# Patient Record
Sex: FEMALE | Race: WHITE | Employment: FULL TIME | URBAN - METROPOLITAN AREA
[De-identification: names, ages, dates, MRNs, and addresses within clinical notes are randomized per-mention and may not be internally consistent; named-entity substitution may affect disease eponyms.]

---

## 2017-03-06 ENCOUNTER — HOSPITAL ENCOUNTER (OUTPATIENT)
Dept: ULTRASOUND IMAGING | Age: 57
Discharge: HOME | End: 2017-03-06
Attending: SPECIALIST
Payer: COMMERCIAL

## 2017-03-06 DIAGNOSIS — E03.9 UNSPECIFIED HYPOTHYROIDISM: ICD-10-CM

## 2017-03-06 DIAGNOSIS — E89.0 POSTSURGICAL HYPOTHYROIDISM: ICD-10-CM

## 2017-03-06 PROCEDURE — 76536 US EXAM OF HEAD AND NECK: CPT

## 2017-05-31 ENCOUNTER — HOSPITAL ENCOUNTER (OUTPATIENT)
Dept: MAMMOGRAPHY | Age: 57
Discharge: HOME | End: 2017-05-31
Attending: OBSTETRICS & GYNECOLOGY
Payer: COMMERCIAL

## 2017-05-31 DIAGNOSIS — Z12.31 VISIT FOR SCREENING MAMMOGRAM: ICD-10-CM

## 2017-05-31 PROCEDURE — 77067 SCR MAMMO BI INCL CAD: CPT

## 2018-07-20 ENCOUNTER — HOSPITAL ENCOUNTER (OUTPATIENT)
Dept: MAMMOGRAPHY | Age: 58
Discharge: HOME | End: 2018-07-20
Attending: OBSTETRICS & GYNECOLOGY
Payer: COMMERCIAL

## 2018-07-20 DIAGNOSIS — Z12.31 VISIT FOR SCREENING MAMMOGRAM: ICD-10-CM

## 2018-07-20 PROCEDURE — 77067 SCR MAMMO BI INCL CAD: CPT

## 2019-03-04 ENCOUNTER — HOSPITAL ENCOUNTER (EMERGENCY)
Age: 59
Discharge: HOME | End: 2019-03-04
Attending: EMERGENCY MEDICINE
Payer: COMMERCIAL

## 2019-03-04 ENCOUNTER — APPOINTMENT (OUTPATIENT)
Dept: GENERAL RADIOLOGY | Age: 59
End: 2019-03-04
Attending: EMERGENCY MEDICINE
Payer: COMMERCIAL

## 2019-03-04 VITALS
WEIGHT: 171.08 LBS | RESPIRATION RATE: 19 BRPM | OXYGEN SATURATION: 97 % | HEIGHT: 66 IN | SYSTOLIC BLOOD PRESSURE: 128 MMHG | HEART RATE: 76 BPM | DIASTOLIC BLOOD PRESSURE: 81 MMHG | BODY MASS INDEX: 27.49 KG/M2

## 2019-03-04 DIAGNOSIS — R07.89 CHEST WALL PAIN: Primary | ICD-10-CM

## 2019-03-04 LAB
ALBUMIN SERPL-MCNC: 3.7 G/DL (ref 3.5–5)
ALBUMIN/GLOB SERPL: 1 {RATIO} (ref 1.1–2.2)
ALP SERPL-CCNC: 86 U/L (ref 45–117)
ALT SERPL-CCNC: 46 U/L (ref 12–78)
ANION GAP SERPL CALC-SCNC: 6 MMOL/L (ref 5–15)
AST SERPL-CCNC: 43 U/L (ref 15–37)
ATRIAL RATE: 76 BPM
BASOPHILS # BLD: 0.1 K/UL (ref 0–0.1)
BASOPHILS NFR BLD: 1 % (ref 0–1)
BILIRUB SERPL-MCNC: 0.3 MG/DL (ref 0.2–1)
BUN SERPL-MCNC: 15 MG/DL (ref 6–20)
BUN/CREAT SERPL: 18 (ref 12–20)
CALCIUM SERPL-MCNC: 9.1 MG/DL (ref 8.5–10.1)
CALCULATED P AXIS, ECG09: 76 DEGREES
CALCULATED R AXIS, ECG10: 97 DEGREES
CALCULATED T AXIS, ECG11: 92 DEGREES
CHLORIDE SERPL-SCNC: 108 MMOL/L (ref 97–108)
CK SERPL-CCNC: 117 U/L (ref 26–192)
CO2 SERPL-SCNC: 29 MMOL/L (ref 21–32)
COMMENT, HOLDF: NORMAL
CREAT SERPL-MCNC: 0.84 MG/DL (ref 0.55–1.02)
D DIMER PPP FEU-MCNC: 0.25 MG/L FEU (ref 0–0.65)
DIAGNOSIS, 93000: NORMAL
DIFFERENTIAL METHOD BLD: NORMAL
EOSINOPHIL # BLD: 0.2 K/UL (ref 0–0.4)
EOSINOPHIL NFR BLD: 4 % (ref 0–7)
ERYTHROCYTE [DISTWIDTH] IN BLOOD BY AUTOMATED COUNT: 13 % (ref 11.5–14.5)
GLOBULIN SER CALC-MCNC: 3.6 G/DL (ref 2–4)
GLUCOSE SERPL-MCNC: 90 MG/DL (ref 65–100)
HCT VFR BLD AUTO: 38.4 % (ref 35–47)
HGB BLD-MCNC: 12.8 G/DL (ref 11.5–16)
IMM GRANULOCYTES # BLD AUTO: 0 K/UL (ref 0–0.04)
IMM GRANULOCYTES NFR BLD AUTO: 0 % (ref 0–0.5)
LYMPHOCYTES # BLD: 1.9 K/UL (ref 0.8–3.5)
LYMPHOCYTES NFR BLD: 36 % (ref 12–49)
MCH RBC QN AUTO: 30.2 PG (ref 26–34)
MCHC RBC AUTO-ENTMCNC: 33.3 G/DL (ref 30–36.5)
MCV RBC AUTO: 90.6 FL (ref 80–99)
MONOCYTES # BLD: 0.3 K/UL (ref 0–1)
MONOCYTES NFR BLD: 6 % (ref 5–13)
NEUTS SEG # BLD: 2.8 K/UL (ref 1.8–8)
NEUTS SEG NFR BLD: 53 % (ref 32–75)
NRBC # BLD: 0 K/UL (ref 0–0.01)
NRBC BLD-RTO: 0 PER 100 WBC
P-R INTERVAL, ECG05: 186 MS
PLATELET # BLD AUTO: 389 K/UL (ref 150–400)
PMV BLD AUTO: 9.7 FL (ref 8.9–12.9)
POTASSIUM SERPL-SCNC: 3.9 MMOL/L (ref 3.5–5.1)
PROT SERPL-MCNC: 7.3 G/DL (ref 6.4–8.2)
Q-T INTERVAL, ECG07: 396 MS
QRS DURATION, ECG06: 84 MS
QTC CALCULATION (BEZET), ECG08: 445 MS
RBC # BLD AUTO: 4.24 M/UL (ref 3.8–5.2)
SAMPLES BEING HELD,HOLD: NORMAL
SODIUM SERPL-SCNC: 143 MMOL/L (ref 136–145)
TROPONIN I SERPL-MCNC: <0.05 NG/ML
TROPONIN I SERPL-MCNC: <0.05 NG/ML
VENTRICULAR RATE, ECG03: 76 BPM
WBC # BLD AUTO: 5.3 K/UL (ref 3.6–11)

## 2019-03-04 PROCEDURE — 85379 FIBRIN DEGRADATION QUANT: CPT

## 2019-03-04 PROCEDURE — 36415 COLL VENOUS BLD VENIPUNCTURE: CPT

## 2019-03-04 PROCEDURE — 84484 ASSAY OF TROPONIN QUANT: CPT

## 2019-03-04 PROCEDURE — 96374 THER/PROPH/DIAG INJ IV PUSH: CPT

## 2019-03-04 PROCEDURE — 82550 ASSAY OF CK (CPK): CPT

## 2019-03-04 PROCEDURE — 93005 ELECTROCARDIOGRAM TRACING: CPT

## 2019-03-04 PROCEDURE — 74011250636 HC RX REV CODE- 250/636: Performed by: EMERGENCY MEDICINE

## 2019-03-04 PROCEDURE — 71046 X-RAY EXAM CHEST 2 VIEWS: CPT

## 2019-03-04 PROCEDURE — 80053 COMPREHEN METABOLIC PANEL: CPT

## 2019-03-04 PROCEDURE — 85025 COMPLETE CBC W/AUTO DIFF WBC: CPT

## 2019-03-04 PROCEDURE — 99285 EMERGENCY DEPT VISIT HI MDM: CPT

## 2019-03-04 RX ORDER — KETOROLAC TROMETHAMINE 10 MG/1
10 TABLET, FILM COATED ORAL
Qty: 20 TAB | Refills: 0 | Status: SHIPPED | OUTPATIENT
Start: 2019-03-04 | End: 2019-11-21

## 2019-03-04 RX ORDER — KETOROLAC TROMETHAMINE 30 MG/ML
30 INJECTION, SOLUTION INTRAMUSCULAR; INTRAVENOUS
Status: COMPLETED | OUTPATIENT
Start: 2019-03-04 | End: 2019-03-04

## 2019-03-04 RX ORDER — DIAZEPAM 5 MG/1
5 TABLET ORAL
Qty: 15 TAB | Refills: 0 | Status: SHIPPED | OUTPATIENT
Start: 2019-03-04 | End: 2019-11-21

## 2019-03-04 RX ADMIN — KETOROLAC TROMETHAMINE 30 MG: 30 INJECTION, SOLUTION INTRAMUSCULAR; INTRAVENOUS at 12:31

## 2019-03-04 NOTE — ED PROVIDER NOTES
EMERGENCY DEPARTMENT HISTORY AND PHYSICAL EXAM 
 
 
Date: 3/4/2019 Patient Name: Deric Hamilton History of Presenting Illness Chief Complaint Patient presents with  Chest Pain  
  ambulatory to triage, reports left sided chest pain that is sharp and radiates to her back approx 1 hour ago pain increases with deep inspiration. No cardiac history, no recent travel or DVT hx  
 
 
History Provided By: Patient HPI: Deric Hamilton, 63 y.o. female with PMHx significant for HTN, hypothyroidism, hypercholesterolemia, presents via wheelchair to the ED with cc of new onset, constant, 6/10, sharp left upper chest pain radiating to the left upper back x 0500 this morning. Pt reports associated sx of mild SOB as well. She expresses she woke up with discomfort this morning with no recent exertion, heavy lifting or heavy pulling. Pt discloses her SOB is exacerbated with deep inspiration and has found no alleviating factors to her sx leading her to the ED. Pt denies taking any medications PTA. Pt denies any h/o DVT/PE, recent surgery, recent travel or being on BCP. She denies any fevers, chills, cough, abdominal pain, nausea, vomiting, diarrhea, dysuria. There are no other complaints, changes, or physical findings at this time. PCP: BELA Clifton 
SHx: (-)Tobacco; (+) ETOH: socially; (-) Illicit drug use No current facility-administered medications on file prior to encounter. Current Outpatient Medications on File Prior to Encounter Medication Sig Dispense Refill  losartan-hydrochlorothiazide (HYZAAR) 100-12.5 mg per tablet TAKE ONE TABLET BY MOUTH ONE TIME DAILY 30 Tab 5  cyanocobalamin (VITAMIN B-12) 1,000 mcg tablet Take 1,000 mcg by mouth daily.  loratadine (CLARITIN) 10 mg tablet Take 10 mg by mouth daily.  naproxen (NAPROSYN) 250 mg tablet Take  by mouth as needed.  lubiPROStone (AMITIZA) 8 mcg capsule Take  by mouth.  levothyroxine (SYNTHROID) 137 mcg tablet Take 137 mcg by mouth Daily (before breakfast).  lansoprazole (PREVACID) 30 mg capsule Take  by mouth Daily (before breakfast).  rosuvastatin (CRESTOR) 5 mg tablet Take  by mouth nightly.  FluvoxaMINE (LUVOX CR) 100 mg CR cap Take 200 mg by mouth daily. Past History Past Medical History: 
Past Medical History:  
Diagnosis Date  Hypercholesteremia  Hypertension  Hypothyroidism Past Surgical History: 
Past Surgical History:  
Procedure Laterality Date  HX BLADDER SUSPENSION    
 HX  SECTION    
 HX HYSTERECTOMY  HX ORTHOPAEDIC    
 neck surgery Family History: No family history on file. Social History: 
Social History Tobacco Use  Smoking status: Never Smoker Substance Use Topics  Alcohol use: Yes Comment: socially  Drug use: No  
 
 
Allergies: Allergies Allergen Reactions  Doxycycline Hives Review of Systems Review of Systems Constitutional: Negative. Negative for appetite change, chills, fatigue and fever. HENT: Negative. Negative for congestion, rhinorrhea, sinus pressure and sore throat. Eyes: Negative. Respiratory: Negative. Negative for cough, choking, chest tightness, shortness of breath and wheezing. Cardiovascular: Positive for chest pain (left upper ). Negative for palpitations and leg swelling. Gastrointestinal: Negative for abdominal pain, constipation, diarrhea, nausea and vomiting. Endocrine: Negative. Genitourinary: Negative. Negative for difficulty urinating, dysuria, flank pain and urgency. Musculoskeletal: Positive for back pain (left upper ). Skin: Negative. Neurological: Negative. Negative for dizziness, speech difficulty, weakness, light-headedness, numbness and headaches. Psychiatric/Behavioral: Negative. All other systems reviewed and are negative.  
 
 
Physical Exam  
Physical Exam  
 Constitutional: She is oriented to person, place, and time. She appears well-developed and well-nourished. No distress. HENT:  
Head: Normocephalic and atraumatic. Mouth/Throat: Oropharynx is clear and moist. No oropharyngeal exudate. Eyes: Conjunctivae and EOM are normal. Pupils are equal, round, and reactive to light. Neck: Normal range of motion. Neck supple. No JVD present. No tracheal deviation present. Cardiovascular: Normal rate, regular rhythm, normal heart sounds and intact distal pulses. No murmur heard. Pulmonary/Chest: Effort normal and breath sounds normal. No stridor. No respiratory distress. She has no wheezes. She has no rales. She exhibits no tenderness. Abdominal: Soft. She exhibits no distension. There is no tenderness. There is no rebound and no guarding. Obese Musculoskeletal: Normal range of motion. She exhibits no edema or tenderness. Neurological: She is alert and oriented to person, place, and time. No cranial nerve deficit. No gross motor or sensory deficits Skin: Skin is warm and dry. She is not diaphoretic. Psychiatric: She has a normal mood and affect. Her behavior is normal.  
Nursing note and vitals reviewed. Diagnostic Study Results Labs - Recent Results (from the past 12 hour(s)) EKG, 12 LEAD, INITIAL Collection Time: 03/04/19  6:27 AM  
Result Value Ref Range Ventricular Rate 76 BPM  
 Atrial Rate 76 BPM  
 P-R Interval 186 ms QRS Duration 84 ms Q-T Interval 396 ms QTC Calculation (Bezet) 445 ms Calculated P Axis 76 degrees Calculated R Axis 97 degrees Calculated T Axis 92 degrees Diagnosis Normal sinus rhythm Confirmed by Sierra Moore ([id number]) on 3/4/2019 8:30:12 AM 
  
CBC WITH AUTOMATED DIFF Collection Time: 03/04/19  7:47 AM  
Result Value Ref Range WBC 5.3 3.6 - 11.0 K/uL  
 RBC 4.24 3.80 - 5.20 M/uL  
 HGB 12.8 11.5 - 16.0 g/dL HCT 38.4 35.0 - 47.0 %  MCV 90.6 80.0 - 99.0 FL  
 MCH 30.2 26.0 - 34.0 PG  
 MCHC 33.3 30.0 - 36.5 g/dL  
 RDW 13.0 11.5 - 14.5 % PLATELET 784 369 - 886 K/uL MPV 9.7 8.9 - 12.9 FL  
 NRBC 0.0 0  WBC ABSOLUTE NRBC 0.00 0.00 - 0.01 K/uL NEUTROPHILS 53 32 - 75 % LYMPHOCYTES 36 12 - 49 % MONOCYTES 6 5 - 13 % EOSINOPHILS 4 0 - 7 % BASOPHILS 1 0 - 1 % IMMATURE GRANULOCYTES 0 0.0 - 0.5 % ABS. NEUTROPHILS 2.8 1.8 - 8.0 K/UL  
 ABS. LYMPHOCYTES 1.9 0.8 - 3.5 K/UL  
 ABS. MONOCYTES 0.3 0.0 - 1.0 K/UL  
 ABS. EOSINOPHILS 0.2 0.0 - 0.4 K/UL  
 ABS. BASOPHILS 0.1 0.0 - 0.1 K/UL  
 ABS. IMM. GRANS. 0.0 0.00 - 0.04 K/UL  
 DF AUTOMATED METABOLIC PANEL, COMPREHENSIVE Collection Time: 03/04/19  7:47 AM  
Result Value Ref Range Sodium 143 136 - 145 mmol/L Potassium 3.9 3.5 - 5.1 mmol/L Chloride 108 97 - 108 mmol/L  
 CO2 29 21 - 32 mmol/L Anion gap 6 5 - 15 mmol/L Glucose 90 65 - 100 mg/dL BUN 15 6 - 20 MG/DL Creatinine 0.84 0.55 - 1.02 MG/DL  
 BUN/Creatinine ratio 18 12 - 20 GFR est AA >60 >60 ml/min/1.73m2 GFR est non-AA >60 >60 ml/min/1.73m2 Calcium 9.1 8.5 - 10.1 MG/DL Bilirubin, total 0.3 0.2 - 1.0 MG/DL  
 ALT (SGPT) 46 12 - 78 U/L  
 AST (SGOT) 43 (H) 15 - 37 U/L Alk. phosphatase 86 45 - 117 U/L Protein, total 7.3 6.4 - 8.2 g/dL Albumin 3.7 3.5 - 5.0 g/dL Globulin 3.6 2.0 - 4.0 g/dL A-G Ratio 1.0 (L) 1.1 - 2.2    
TROPONIN I Collection Time: 03/04/19  7:47 AM  
Result Value Ref Range Troponin-I, Qt. <0.05 <0.05 ng/mL CK W/ REFLX CKMB Collection Time: 03/04/19  7:47 AM  
Result Value Ref Range  26 - 192 U/L  
D DIMER Collection Time: 03/04/19  9:16 AM  
Result Value Ref Range D-dimer 0.25 0.00 - 0.65 mg/L FEU  
TROPONIN I Collection Time: 03/04/19 10:06 AM  
Result Value Ref Range Troponin-I, Qt. <0.05 <0.05 ng/mL Radiologic Studies -  
XR CHEST PA LAT Final Result IMPRESSION:  
NORMAL CHEST. Medical Decision Making I am the first provider for this patient. I reviewed the vital signs, available nursing notes, past medical history, past surgical history, family history and social history. Vital Signs-Reviewed the patient's vital signs. Patient Vitals for the past 12 hrs: 
 Pulse Resp BP SpO2  
03/04/19 1230 69 18 128/83 97 % 03/04/19 1145 70 18 143/87 96 % 03/04/19 1000 73 17 132/90 97 % 03/04/19 0900 64 16 148/89 96 % 03/04/19 0830 77 17 150/88 99 % 03/04/19 0800 69 19 145/90   
03/04/19 0622 75 16 (!) 152/100 99 % Pulse Oximetry Analysis - 99% on RA Cardiac Monitor:  
Rate: 75 bpm 
Rhythm: Normal Sinus Rhythm EKG interpretation: (Preliminary) NSR, rate 76, normal axis/pr/qrs, no acute ST changes, Elvie Goltz, DO 
 
 
Records Reviewed: Nursing Notes, Old Medical Records, Previous electrocardiograms, Previous Radiology Studies and Previous Laboratory Studies Provider Notes (Medical Decision Making): DDx: PE, ACS, chest wall strain ED Course:  
Initial assessment performed. The patients presenting problems have been discussed, and they are in agreement with the care plan formulated and outlined with them. I have encouraged them to ask questions as they arise throughout their visit. Progress Notes: 
 
1:00 PM  
The pt has been re-evaluated. She expresses her sx have improved with tx in the ED. Pt was updated on reassuring workup and plan for discharge with PCP and cardiology follow up if sx persist.  
 
Critical Care Time:  
None Disposition: 
Discharge Note: 
1:01 PM 
The patient is ready for discharge. The patient's signs, symptoms, diagnosis, and discharge instruction have been discussed and the patient has conveyed their understanding. The patient is to follow up as recommended or return to the ER should their symptoms worsen. Plan has been discussed and the patient is in agreement. Written by Lizy Brower ED Scribe, as dictated by Paula Carbajal DO 
 
PLAN: 
1. Current Discharge Medication List  
  
 
2. Follow-up Information None Return to ED if worse Diagnosis Clinical Impression: No diagnosis found. Attestations: 
 
Attestation: This note is prepared by Niyah Brower, acting as Scribe for Mona Muñoz, [address], DO: The scribe's documentation has been prepared under my direction and personally reviewed by me in its entirety. I confirm that the note above accurately reflects all work, treatment, procedures, and medical decision making performed by me.

## 2019-03-04 NOTE — DISCHARGE INSTRUCTIONS

## 2019-03-04 NOTE — ED NOTES
Baldomero Wise DO reviewed discharge instructions with the patient. The patient verbalized understanding. Ambulatory on discharge.

## 2019-08-26 ENCOUNTER — HOSPITAL ENCOUNTER (OUTPATIENT)
Dept: ULTRASOUND IMAGING | Age: 59
Discharge: HOME | End: 2019-08-26
Attending: SPECIALIST
Payer: COMMERCIAL

## 2019-08-26 ENCOUNTER — HOSPITAL ENCOUNTER (OUTPATIENT)
Dept: MAMMOGRAPHY | Age: 59
Discharge: HOME | End: 2019-08-26
Attending: PHYSICIAN ASSISTANT
Payer: COMMERCIAL

## 2019-08-26 DIAGNOSIS — E04.1 NONTOXIC UNINODULAR GOITER: ICD-10-CM

## 2019-08-26 DIAGNOSIS — Z12.39 BREAST SCREENING, UNSPECIFIED: ICD-10-CM

## 2019-08-26 PROCEDURE — 76536 US EXAM OF HEAD AND NECK: CPT

## 2019-08-26 PROCEDURE — 77067 SCR MAMMO BI INCL CAD: CPT

## 2019-11-21 ENCOUNTER — OFFICE VISIT (OUTPATIENT)
Dept: CARDIOLOGY CLINIC | Age: 59
End: 2019-11-21

## 2019-11-21 VITALS
HEART RATE: 70 BPM | WEIGHT: 175.9 LBS | RESPIRATION RATE: 18 BRPM | BODY MASS INDEX: 28.27 KG/M2 | HEIGHT: 66 IN | OXYGEN SATURATION: 95 % | DIASTOLIC BLOOD PRESSURE: 78 MMHG | SYSTOLIC BLOOD PRESSURE: 134 MMHG

## 2019-11-21 DIAGNOSIS — I10 ESSENTIAL HYPERTENSION: ICD-10-CM

## 2019-11-21 DIAGNOSIS — E78.2 MIXED HYPERLIPIDEMIA: ICD-10-CM

## 2019-11-21 DIAGNOSIS — R07.9 CHEST PAIN, UNSPECIFIED TYPE: Primary | ICD-10-CM

## 2019-11-21 RX ORDER — ROSUVASTATIN CALCIUM 10 MG/1
10 TABLET, COATED ORAL
Qty: 90 TAB | Refills: 3 | Status: SHIPPED | OUTPATIENT
Start: 2019-11-21 | End: 2021-02-18

## 2019-11-21 RX ORDER — FENOFIBRATE 160 MG/1
160 TABLET ORAL DAILY
COMMUNITY
Start: 2017-09-28 | End: 2019-11-21

## 2019-11-21 RX ORDER — LOSARTAN POTASSIUM 50 MG/1
25 TABLET ORAL DAILY
COMMUNITY
End: 2020-03-13 | Stop reason: SDUPTHER

## 2019-11-21 RX ORDER — TOLTERODINE 2 MG/1
2 CAPSULE, EXTENDED RELEASE ORAL DAILY
COMMUNITY
Start: 2019-10-15 | End: 2020-12-15

## 2019-11-21 NOTE — PROGRESS NOTES
Marimar Tan, [facility]    Subjective/HPI:     Ms. Nell Landry is a 63 y.o. female is here to establish care. She has a PMHx of HTN, HLD and hypothyroidism. She reports symptoms of bilateral arm tightness that occurs mainly with rest, such as driving or sitting at work. The symptoms resolve on their own. She has no associated symptoms of shortness of breath, dizziness or palpitations. Symptoms have been present for a few months, however noticed an improvement in symptoms in the past month after adjusting the dose of her thyroid medication. She previously had stress test and echocardiogram done in , for different symptoms, which were both normal.         PCP Provider  BELA Silver  Past Medical History:   Diagnosis Date    Hypercholesteremia     Hypertension     Hypothyroidism       Past Surgical History:   Procedure Laterality Date    HX BLADDER SUSPENSION      HX  SECTION      HX HYSTERECTOMY      HX ORTHOPAEDIC      neck surgery     No family history on file.   Social History     Socioeconomic History    Marital status:      Spouse name: Not on file    Number of children: Not on file    Years of education: Not on file    Highest education level: Not on file   Occupational History    Not on file   Social Needs    Financial resource strain: Not on file    Food insecurity:     Worry: Not on file     Inability: Not on file    Transportation needs:     Medical: Not on file     Non-medical: Not on file   Tobacco Use    Smoking status: Never Smoker    Smokeless tobacco: Never Used   Substance and Sexual Activity    Alcohol use: Yes     Comment: socially    Drug use: No    Sexual activity: Not on file   Lifestyle    Physical activity:     Days per week: Not on file     Minutes per session: Not on file    Stress: Not on file   Relationships    Social connections:     Talks on phone: Not on file     Gets together: Not on file     Attends Hinduism service: Not on file     Active member of club or organization: Not on file     Attends meetings of clubs or organizations: Not on file     Relationship status: Not on file    Intimate partner violence:     Fear of current or ex partner: Not on file     Emotionally abused: Not on file     Physically abused: Not on file     Forced sexual activity: Not on file   Other Topics Concern    Not on file   Social History Narrative    Not on file       Allergies   Allergen Reactions    Doxycycline Hives        Current Outpatient Medications   Medication Sig    losartan (COZAAR) 100 mg tablet Take 100 mg by mouth daily.  fenofibrate (LOFIBRA) 160 mg tablet Take 160 mg by mouth daily.  tolterodine ER (DETROL-LA) 2 mg ER capsule Take 2 mg by mouth daily.  cyanocobalamin (VITAMIN B-12) 1,000 mcg tablet Take 1,000 mcg by mouth daily.  loratadine (CLARITIN) 10 mg tablet Take 10 mg by mouth daily.  naproxen (NAPROSYN) 250 mg tablet Take  by mouth as needed.  lubiPROStone (AMITIZA) 8 mcg capsule Take 8 mcg by mouth daily as needed.  levothyroxine (SYNTHROID) 137 mcg tablet Take 137 mcg by mouth Daily (before breakfast).  rosuvastatin (CRESTOR) 5 mg tablet Take  by mouth nightly.  FluvoxaMINE (LUVOX CR) 100 mg CR cap Take 200 mg by mouth daily.  ketorolac (TORADOL) 10 mg tablet Take 1 Tab by mouth every six (6) hours as needed for Pain.  diazePAM (VALIUM) 5 mg tablet Take 1 Tab by mouth every eight (8) hours as needed (spasm). Max Daily Amount: 15 mg.    losartan-hydrochlorothiazide (HYZAAR) 100-12.5 mg per tablet TAKE ONE TABLET BY MOUTH ONE TIME DAILY    lansoprazole (PREVACID) 30 mg capsule Take  by mouth Daily (before breakfast). No current facility-administered medications for this visit. I have reviewed the problem list, allergy list, medical history, family, social history and medications.     Review of Symptoms:    Review of Systems   Constitutional: Negative for chills, fever and weight loss. HENT: Negative for nosebleeds. Eyes: Negative for blurred vision and double vision. Respiratory: Negative for cough, shortness of breath and wheezing. Cardiovascular: Negative for chest pain, palpitations, orthopnea, leg swelling and PND. Gastrointestinal: Negative for abdominal pain, blood in stool, diarrhea, nausea and vomiting. Musculoskeletal: Negative for joint pain. Skin: Negative for rash. Neurological: Negative for dizziness, tingling and loss of consciousness. Endo/Heme/Allergies: Does not bruise/bleed easily. Physical Exam:      General: Well developed, in no acute distress, cooperative and alert  HEENT: No carotid bruits, no JVD, trach is midline. Neck Supple, PEERL, EOM intact. Heart:  reg rate and rhythm; normal S1/S2; no murmurs, gallops or rubs. Respiratory: Clear bilaterally x 4, no wheezing or rales  Abdomen:   Soft, non-tender, no distention, no masses. + BS. Extremities:  Normal cap refill, no cyanosis, atraumatic. No edema. Neuro: A&Ox3, speech clear, gait stable. Skin: Skin color is normal. No rashes or lesions.  Non diaphoretic  Vascular: 2+ pulses symmetric in all extremities    Vitals:    11/21/19 1334 11/21/19 1349 11/21/19 1351   BP: 122/78 130/80 134/78   Pulse: 70     Resp: 18     SpO2: 95%     Weight: 175 lb 14.4 oz (79.8 kg)     Height: 5' 6\" (1.676 m)         Cardiographics    ECG: sinus rhythm  Results for orders placed or performed during the hospital encounter of 03/04/19   EKG, 12 LEAD, INITIAL   Result Value Ref Range    Ventricular Rate 76 BPM    Atrial Rate 76 BPM    P-R Interval 186 ms    QRS Duration 84 ms    Q-T Interval 396 ms    QTC Calculation (Bezet) 445 ms    Calculated P Axis 76 degrees    Calculated R Axis 97 degrees    Calculated T Axis 92 degrees    Diagnosis       Normal sinus rhythm  Confirmed by Tc Streeter ([id number]) on 3/4/2019 8:30:12 AM         Cardiology Labs:  No results found for: CHOL, CHOLX, CHLST, CHOLV, 767004, HDL, HDLP, LDL, LDLC, DLDLP, TGLX, TRIGL, TRIGP, CHHD, CHHDX    Lab Results   Component Value Date/Time    Sodium 143 03/04/2019 07:47 AM    Potassium 3.9 03/04/2019 07:47 AM    Chloride 108 03/04/2019 07:47 AM    CO2 29 03/04/2019 07:47 AM    Anion gap 6 03/04/2019 07:47 AM    Glucose 90 03/04/2019 07:47 AM    BUN 15 03/04/2019 07:47 AM    Creatinine 0.84 03/04/2019 07:47 AM    BUN/Creatinine ratio 18 03/04/2019 07:47 AM    GFR est AA >60 03/04/2019 07:47 AM    GFR est non-AA >60 03/04/2019 07:47 AM    Calcium 9.1 03/04/2019 07:47 AM    Bilirubin, total 0.3 03/04/2019 07:47 AM    AST (SGOT) 43 (H) 03/04/2019 07:47 AM    Alk. phosphatase 86 03/04/2019 07:47 AM    Protein, total 7.3 03/04/2019 07:47 AM    Albumin 3.7 03/04/2019 07:47 AM    Globulin 3.6 03/04/2019 07:47 AM    A-G Ratio 1.0 (L) 03/04/2019 07:47 AM    ALT (SGPT) 46 03/04/2019 07:47 AM           Assessment:     Assessment:       ICD-10-CM ICD-9-CM    1. Chest pain, unspecified type R07.9 786.50 AMB POC EKG ROUTINE W/ 12 LEADS, INTER & REP   2. Mixed hyperlipidemia E78.2 272.2    3. Essential hypertension I10 401.9         Plan:     1. Chest pain, unspecified type  Somewhat atypical symptoms in a patient with risk factors including HTN, HLD, age, family history. Will obtain stress echo and echo to assess  If no recurrent symptoms, then would encourage risk factor modification    2. HLD   in 5/2019; switched from Pravastatin to Crestor 5 mg daily  Recommend increasing Crestor to 10 mg daily    3. HTN  BP controlled. Continue anti-hypertensive therapy and low sodium diet    F/u with Dr. Yee Noel after testing complete. Adelaide Ibarra NP      [facility]    11/22/2019         Patient seen, examined by me personally. Plan discussed as detailed. Agree with note as outlined by  NP. I confirm findings in history and physical exam. No additional findings noted. Agree with plan as outlined above.      Salomon Teague, MD

## 2019-11-21 NOTE — PROGRESS NOTES
1. Have you been to the ER, urgent care clinic since your last visit? Hospitalized since your last visit? No    2. Have you seen or consulted any other health care providers outside of the [address] [place] [address] since your last visit? Include any pap smears or colon screening. No    Chief Complaint   Patient presents with    Chest Pain     Last seen here 2014/2015. 1NP ref by  Job Teague NP.  C/O CP rest or exertion, weakness.

## 2019-12-10 ENCOUNTER — OFFICE VISIT (OUTPATIENT)
Dept: CARDIOLOGY CLINIC | Age: 59
End: 2019-12-10

## 2019-12-10 VITALS
DIASTOLIC BLOOD PRESSURE: 68 MMHG | SYSTOLIC BLOOD PRESSURE: 130 MMHG | RESPIRATION RATE: 16 BRPM | OXYGEN SATURATION: 97 % | BODY MASS INDEX: 27.95 KG/M2 | HEIGHT: 66 IN | HEART RATE: 76 BPM | WEIGHT: 173.9 LBS

## 2019-12-10 DIAGNOSIS — R07.89 ATYPICAL CHEST PAIN: Primary | ICD-10-CM

## 2019-12-10 DIAGNOSIS — I10 ESSENTIAL HYPERTENSION: ICD-10-CM

## 2019-12-10 DIAGNOSIS — R94.39 ABNORMAL STRESS ECHO: ICD-10-CM

## 2019-12-10 RX ORDER — FENOFIBRATE 48 MG/1
160 TABLET, COATED ORAL DAILY
COMMUNITY
End: 2020-06-12

## 2019-12-10 RX ORDER — ASPIRIN 81 MG/1
81 TABLET ORAL DAILY
COMMUNITY
Start: 2019-12-10 | End: 2020-06-12

## 2019-12-10 RX ORDER — AMLODIPINE BESYLATE 2.5 MG/1
2.5 TABLET ORAL DAILY
Qty: 30 TAB | Refills: 3 | Status: SHIPPED | OUTPATIENT
Start: 2019-12-10 | End: 2020-01-15 | Stop reason: ALTCHOICE

## 2019-12-10 NOTE — PROGRESS NOTES
[facility] @ [facility] D[facility], ANP-BC  Subjective/HPI:     Tita Henson is a 63 y.o. female is here for stress echo follow-up. Patient initially seen by us for reports of bilateral arm weakness and atypical discomfort that occurs with driving or at times at rest she denies any exertional component. She is able to perform physical activity without dyspnea on exertion chest pain or fatigue. · Baseline ECG: Normal EKG. · Negative stress test.  · Abnormal stress echocardiogram consistent with ischemia. Intermediate risk study. Mild inferobasal hypokinesis. · Low risk Duke treadmill score. Stress Findings     ECG Baseline ECG: Normal EKG. There was no ST segment deviation noted during rest. There were no arrhythmias during stress. There was no ST segment deviation noted during stress. There were no arrhythmias during recovery. ECG is diagnostic. Stress Findings A stress test was performed following the Marcos protocol, with the patient reaching stage 3. The test was stopped because the patient complained of fatigue. The patient reported no angina during stress. The patient's response to exercise was adequate for diagnosis. Blood pressure demonstrated a normal response to exercise. Heart rate demonstrated maximal response to stress. Overall, the patient's exercise capacity was normal.   Angina Index is 0. PCP Provider  BELA Lazo  Past Medical History:   Diagnosis Date    Hypercholesteremia     Hypertension     Hypothyroidism       Past Surgical History:   Procedure Laterality Date    HX BLADDER SUSPENSION      HX  SECTION      HX HYSTERECTOMY      HX ORTHOPAEDIC      neck surgery     Allergies   Allergen Reactions    Doxycycline Hives      No family history on file. Current Outpatient Medications   Medication Sig    fenofibrate nanocrystallized (TRICOR) 48 mg tablet Take  by mouth daily.     amLODIPine (NORVASC) 2.5 mg tablet Take 1 Tab by mouth daily.  losartan (COZAAR) 100 mg tablet Take 100 mg by mouth daily.  tolterodine ER (DETROL-LA) 2 mg ER capsule Take 2 mg by mouth daily.  rosuvastatin (CRESTOR) 10 mg tablet Take 1 Tab by mouth nightly.  naproxen (NAPROSYN) 250 mg tablet Take  by mouth as needed.  lubiPROStone (AMITIZA) 8 mcg capsule Take 8 mcg by mouth daily as needed.  levothyroxine (SYNTHROID) 137 mcg tablet Take 125 mcg by mouth Daily (before breakfast).  FluvoxaMINE (LUVOX CR) 100 mg CR cap Take 200 mg by mouth daily.  cyanocobalamin (VITAMIN B-12) 1,000 mcg tablet Take 1,000 mcg by mouth daily.  loratadine (CLARITIN) 10 mg tablet Take 10 mg by mouth daily. No current facility-administered medications for this visit.        Vitals:    12/10/19 0921 12/10/19 0928   BP: 122/68 130/68   Pulse: 76    Resp: 16    SpO2: 97%    Weight: 173 lb 14.4 oz (78.9 kg)    Height: 5' 6\" (1.676 m)      Social History     Socioeconomic History    Marital status:      Spouse name: Not on file    Number of children: Not on file    Years of education: Not on file    Highest education level: Not on file   Occupational History    Not on file   Social Needs    Financial resource strain: Not on file    Food insecurity:     Worry: Not on file     Inability: Not on file    Transportation needs:     Medical: Not on file     Non-medical: Not on file   Tobacco Use    Smoking status: Never Smoker    Smokeless tobacco: Never Used   Substance and Sexual Activity    Alcohol use: Yes     Comment: socially    Drug use: No    Sexual activity: Not on file   Lifestyle    Physical activity:     Days per week: Not on file     Minutes per session: Not on file    Stress: Not on file   Relationships    Social connections:     Talks on phone: Not on file     Gets together: Not on file     Attends Holiness service: Not on file     Active member of club or organization: Not on file     Attends meetings of clubs or organizations: Not on file     Relationship status: Not on file    Intimate partner violence:     Fear of current or ex partner: Not on file     Emotionally abused: Not on file     Physically abused: Not on file     Forced sexual activity: Not on file   Other Topics Concern    Not on file   Social History Narrative    Not on file       I have reviewed the nurses notes, vitals, problem list, allergy list, medical history, family, social history and medications. Review of Symptoms  11 systems reviewed, negative other than as stated in the HPI      Physical Exam:      General: Well developed, in no acute distress, cooperative and alert  HEENT: No carotid bruits, no JVD, trach is midline. Neck Supple, PERRL, EOM intact. Heart:  Normal S1/S2 negative S3 or S4. Regular, no murmur, gallop or rub. Respiratory: Clear bilaterally x 4, no wheezing or rales  Abdomen:   Soft, non-tender, no masses, bowel sounds are active. Extremities:  No edema, normal cap refill, no cyanosis, atraumatic. Neuro: A&Ox3, speech clear, gait stable. Skin: Skin color is normal. No rashes or lesions. Non diaphoretic  Vascular: 2+ pulses symmetric in all extremities    Cardiographics    ECG:         Cardiology Labs:  No results found for: CHOL, CHOLX, CHLST, CHOLV, [id number], HDL, HDLP, LDL, LDLC, DLDLP, TGLX, TRIGL, TRIGP, CHHD, CHHDX    Lab Results   Component Value Date/Time    Sodium 143 03/04/2019 07:47 AM    Potassium 3.9 03/04/2019 07:47 AM    Chloride 108 03/04/2019 07:47 AM    CO2 29 03/04/2019 07:47 AM    Anion gap 6 03/04/2019 07:47 AM    Glucose 90 03/04/2019 07:47 AM    BUN 15 03/04/2019 07:47 AM    Creatinine 0.84 03/04/2019 07:47 AM    BUN/Creatinine ratio 18 03/04/2019 07:47 AM    GFR est AA >60 03/04/2019 07:47 AM    GFR est non-AA >60 03/04/2019 07:47 AM    Calcium 9.1 03/04/2019 07:47 AM    Bilirubin, total 0.3 03/04/2019 07:47 AM    AST (SGOT) 43 (H) 03/04/2019 07:47 AM    Alk.  phosphatase 86 03/04/2019 07:47 AM    Protein, total 7.3 03/04/2019 07:47 AM    Albumin 3.7 03/04/2019 07:47 AM    Globulin 3.6 03/04/2019 07:47 AM    A-G Ratio 1.0 (L) 03/04/2019 07:47 AM    ALT (SGPT) 46 03/04/2019 07:47 AM           Assessment:     Assessment:     Diagnoses and all orders for this visit:    1. Atypical chest pain    2. Essential hypertension    3. Abnormal stress echo    Other orders  -     amLODIPine (NORVASC) 2.5 mg tablet; Take 1 Tab by mouth daily. ICD-10-CM ICD-9-CM    1. Atypical chest pain R07.89 786.59    2. Essential hypertension I10 401.9    3. Abnormal stress echo R94.39 794.39      Orders Placed This Encounter    fenofibrate nanocrystallized (TRICOR) 48 mg tablet     Sig: Take  by mouth daily.  amLODIPine (NORVASC) 2.5 mg tablet     Sig: Take 1 Tab by mouth daily. Dispense:  30 Tab     Refill:  3        Plan:     Patient is a 30-year-old female who presents with a small inferior apical defect on stress echo with normal stress EKG and asymptomatic. She remains asymptomatic of atherosclerotic heart disease. Cardiac risk factors include hypertension hyperlipidemia. We will add amlodipine 2.5 mg to act as antianginal therapy, start enteric-coated baby aspirin 81 mg. Follow-up in 1 month. If any recurrence of symptoms would then proceed with cardiac catheterization otherwise at this time medically treat symptoms and hypertension. Ivone Marcos, NP      Please note that this dictation was completed with Stratus[employer], the computer voice recognition software. Quite often unanticipated grammatical, syntax, homophones, and other interpretive errors are inadvertently transcribed by the computer software. Please disregard these errors. Please excuse any errors that have escaped final proofreading. Thank you. [facility]    12/10/2019         Patient seen, examined by me personally. Plan discussed as detailed. Agree with note as outlined by  NP.  I confirm findings in history and physical exam. No additional findings noted. Agree with plan as outlined above. Advised to decrease losartan to 50 mg if BP low.     Jay Jay Daniel MD

## 2019-12-10 NOTE — PROGRESS NOTES
1. Have you been to the ER, urgent care clinic since your last visit? Hospitalized since your last visit? NO    2. Have you seen or consulted any other health care providers outside of the [address] [place] [address] since your last visit? Include any pap smears or colon screening. NO    RESULTS.  NO ADDITIONAL CARDIAC C/O

## 2020-01-15 ENCOUNTER — TELEPHONE (OUTPATIENT)
Dept: CARDIOLOGY CLINIC | Age: 60
End: 2020-01-15

## 2020-01-15 RX ORDER — METOPROLOL SUCCINATE 50 MG/1
TABLET, EXTENDED RELEASE ORAL DAILY
COMMUNITY
End: 2020-01-15 | Stop reason: ALTCHOICE

## 2020-01-15 RX ORDER — METOPROLOL SUCCINATE 25 MG/1
TABLET, EXTENDED RELEASE ORAL
Qty: 30 TAB | Refills: 2 | Status: SHIPPED | OUTPATIENT
Start: 2020-01-15 | End: 2020-06-12 | Stop reason: SDUPTHER

## 2020-01-15 RX ORDER — METOPROLOL SUCCINATE 25 MG/1
TABLET, EXTENDED RELEASE ORAL DAILY
COMMUNITY
End: 2020-01-15 | Stop reason: SDUPTHER

## 2020-01-15 NOTE — TELEPHONE ENCOUNTER
Verified patient with two identifiers. Advised pt to stop taking the amlodipine and to start taking 12.5 mg of Toprol daily and follow up as planned on 2/24. She verbalized understanding. Will send new script to pharmacy. LOBITO Nicole LPN   Caller: Unspecified (Today, 12:10 PM)             I would stop the amlodipine. Facial flushing is a common problem with amlodipine, especially with females. Let's switch her to Toprol 12.5 mg daily. Keep follow up appt as scheduled -- we need to see if she has had any recurrence of the arm pain.

## 2020-01-15 NOTE — TELEPHONE ENCOUNTER
Patient would like to speak with the nurse in reference to the following medication amLODIPine (NORVASC) 2.5 mg tablet             Thanks

## 2020-01-15 NOTE — TELEPHONE ENCOUNTER
Verified patient with two identifiers. Spoke with pt she stated she is getting a lot of facial flushing since starting amlodipine. She has been on a month, should she expect the flushing to get better or do you want to change to a different med?

## 2020-02-03 ENCOUNTER — TELEPHONE (OUTPATIENT)
Dept: CARDIOLOGY CLINIC | Age: 60
End: 2020-02-03

## 2020-02-03 RX ORDER — ISOSORBIDE MONONITRATE 30 MG/1
30 TABLET, EXTENDED RELEASE ORAL
Qty: 30 TAB | Refills: 3 | Status: SHIPPED | OUTPATIENT
Start: 2020-02-03 | End: 2020-06-12

## 2020-02-03 RX ORDER — ISOSORBIDE MONONITRATE 30 MG/1
TABLET, EXTENDED RELEASE ORAL DAILY
COMMUNITY
End: 2020-02-03 | Stop reason: SDUPTHER

## 2020-02-03 NOTE — TELEPHONE ENCOUNTER
Left message for patient to return my call. Helen Smith, LOBITO  Samantha , LPN   Caller: Unspecified (Today,  8:36 AM)             Please confirm if patient started taking Toprol as prescribed? If so, then please start Imdur 30 mg daily.  Keep follow up as scheduled with Dr. Meseret Valenzuela on 2/24. If she continues to have symptoms 1 week after starting Imdur, then please call me back. Will need cardiac cath as discussed in 12/2019 visit.

## 2020-02-03 NOTE — TELEPHONE ENCOUNTER
Verified patient with two identifiers. Spoke with pt advising her to start taking 30 mg of Imdur daily as she is taking Topol daily. Advised her is she is still having chest pain after a week to call office, we will have to schedule cath. She verbalized understanding.

## 2020-02-03 NOTE — TELEPHONE ENCOUNTER
Verified patient with two identifiers. Spoke with pt, she is experiencing a tightness and heaviness in chest off and on. Over the weekend, more frequently. Feels like its hard to take a deep breath. BP this am, 120/80. Please advise.

## 2020-02-28 ENCOUNTER — OFFICE VISIT (OUTPATIENT)
Dept: CARDIOLOGY CLINIC | Age: 60
End: 2020-02-28

## 2020-02-28 VITALS
WEIGHT: 173.1 LBS | HEIGHT: 66 IN | HEART RATE: 67 BPM | BODY MASS INDEX: 27.82 KG/M2 | OXYGEN SATURATION: 97 % | DIASTOLIC BLOOD PRESSURE: 60 MMHG | RESPIRATION RATE: 16 BRPM | SYSTOLIC BLOOD PRESSURE: 120 MMHG

## 2020-02-28 DIAGNOSIS — I10 ESSENTIAL HYPERTENSION: ICD-10-CM

## 2020-02-28 DIAGNOSIS — E78.2 MIXED HYPERLIPIDEMIA: ICD-10-CM

## 2020-02-28 DIAGNOSIS — R94.39 ABNORMAL STRESS ECHO: ICD-10-CM

## 2020-02-28 DIAGNOSIS — R07.89 ATYPICAL CHEST PAIN: Primary | ICD-10-CM

## 2020-02-28 RX ORDER — FENOFIBRATE 160 MG/1
1 TABLET ORAL DAILY
COMMUNITY
Start: 2020-01-24

## 2020-02-28 RX ORDER — ERGOCALCIFEROL 1.25 MG/1
1 CAPSULE ORAL
COMMUNITY
Start: 2020-02-03

## 2020-02-28 RX ORDER — CYANOCOBALAMIN 1000 UG/ML
1 INJECTION, SOLUTION INTRAMUSCULAR; SUBCUTANEOUS
COMMUNITY
Start: 2020-02-19

## 2020-02-28 NOTE — PROGRESS NOTES
1. Have you been to the ER, urgent care clinic since your last visit? Hospitalized since your last visit? NO    2. Have you seen or consulted any other health care providers outside of the [address] [place] [address] since your last visit? Include any pap smears or colon screening. YES, ENDO.    1 MONTH FOLLOW UP. C/O EXTREME TIREDNESS, SLIGHT SOB.

## 2020-02-28 NOTE — PROGRESS NOTES
NAME:  Yoselyn Henning   :   1960   MRN:   [id number]   PCP:  BELA Portillo           Subjective: The patient is a 63y.o. year old female  who returns for a routine follow-up. Since the last visit, patient reports no change in exercise tolerance,  edema, medication intolerance, palpitations, shortness of PND/orthopnea wheezing, sputum, syncope, dizziness or light headedness. Continues to have chest pain, SOB despite medications. Past Medical History:   Diagnosis Date    Hypercholesteremia     Hypertension     Hypothyroidism         ICD-10-CM ICD-9-CM    1. Atypical chest pain R07.89 786.59    2. Essential hypertension I10 401.9 AMB POC EKG ROUTINE W/ 12 LEADS, INTER & REP   3. Abnormal stress echo R94.39 794.39    4. Mixed hyperlipidemia E78.2 272.2       Social History     Tobacco Use    Smoking status: Never Smoker    Smokeless tobacco: Never Used   Substance Use Topics    Alcohol use: Yes     Comment: socially      No family history on file. Review of Systems  General: Pt denies excessive weight gain or loss. Pt is able to conduct ADL's  HEENT: Denies blurred vision, headaches, epistaxis and difficulty swallowing. Respiratory: Denies shortness of breath, JAUREGUI, wheezing or stridor. Cardiovascular: Denies precordial pain, palpitations, edema or PND  Gastrointestinal: Denies poor appetite, indigestion, abdominal pain or blood in stool  Musculoskeletal: Denies pain or swelling from muscles or joints  Neurologic: Denies tremor, paresthesias, or sensory motor disturbance  Skin: Denies rash, itching or texture change. Objective:       Vitals:    20 0935 20 0947   BP: 120/64 120/60   Pulse: 67    Resp: 16    SpO2: 97%    Weight: 173 lb 1.6 oz (78.5 kg)    Height: 5' 6\" (1.676 m)     Body mass index is 27.94 kg/m². General PE  Mental Status - Alert. General Appearance - Not in acute distress.     Chest and Lung Exam   Inspection: Accessory muscles - No use of accessory muscles in breathing. Auscultation:   Breath sounds: - Normal.    Cardiovascular   Inspection: Jugular vein - Bilateral - Inspection Normal.  Palpation/Percussion:   Apical Impulse: - Normal.  Auscultation: Rhythm - Regular. Heart Sounds - S1 WNL and S2 WNL. No S3 or S4. Murmurs & Other Heart Sounds: Auscultation of the heart reveals - No Murmurs. Peripheral Vascular   Upper Extremity: Inspection - Bilateral - No Cyanotic nailbeds or Digital clubbing. Lower Extremity:   Palpation: Edema - Bilateral - No edema. Data Review:     EKG -EKG: normal EKG, normal sinus rhythm, unchanged from previous tracings. Medications reviewed  Current Outpatient Medications   Medication Sig    VITAMIN D2 1,250 mcg (50,000 unit) capsule Take 1 Cap by mouth daily.  fenofibrate (LOFIBRA) 160 mg tablet Take 1 Tab by mouth daily.  cyanocobalamin (VITAMIN B12) 1,000 mcg/mL injection 1 mL by IntraMUSCular route every fourteen (14) days.  isosorbide mononitrate ER (IMDUR) 30 mg tablet Take 1 Tab by mouth every morning.  metoprolol succinate (TOPROL XL) 25 mg XL tablet Take 0.5 mg daily    aspirin delayed-release 81 mg tablet Take 1 Tab by mouth daily.  losartan (COZAAR) 50 mg tablet Take 25 mg by mouth daily.  tolterodine ER (DETROL-LA) 2 mg ER capsule Take 2 mg by mouth daily.  rosuvastatin (CRESTOR) 10 mg tablet Take 1 Tab by mouth nightly.  loratadine (CLARITIN) 10 mg tablet Take 10 mg by mouth as needed.  naproxen (NAPROSYN) 250 mg tablet Take  by mouth as needed.  lubiPROStone (AMITIZA) 8 mcg capsule Take 8 mcg by mouth daily as needed.  levothyroxine (SYNTHROID) 137 mcg tablet Take 125 mcg by mouth Daily (before breakfast).  FluvoxaMINE (LUVOX CR) 100 mg CR cap Take 200 mg by mouth daily.  fenofibrate nanocrystallized (TRICOR) 48 mg tablet Take 160 mg by mouth daily.     cyanocobalamin (VITAMIN B-12) 1,000 mcg tablet Take 1,000 mcg by mouth daily. No current facility-administered medications for this visit. Assessment:       ICD-10-CM ICD-9-CM    1. Atypical chest pain R07.89 786.59    2. Essential hypertension I10 401.9 AMB POC EKG ROUTINE W/ 12 LEADS, INTER & REP   3. Abnormal stress echo R94.39 794.39    4. Mixed hyperlipidemia E78.2 272.2         Plan:     Patient presents with continued symptoms despite anti anginal therapy. Her stress echo was abnormal. Discussed cath/PCI.     [address] [place] [address]  Renea Roca MD

## 2020-02-28 NOTE — H&P (VIEW-ONLY)
NAME:  Luna Wiggins :   1960 MRN:   [id number] PCP:  BELA Bacon 
 
    
 
Subjective: The patient is a 63y.o. year old female  who returns for a routine follow-up. Since the last visit, patient reports no change in exercise tolerance,  edema, medication intolerance, palpitations, shortness of PND/orthopnea wheezing, sputum, syncope, dizziness or light headedness. Continues to have chest pain, SOB despite medications. Past Medical History:  
Diagnosis Date  Hypercholesteremia  Hypertension  Hypothyroidism ICD-10-CM ICD-9-CM 1. Atypical chest pain R07.89 786.59   
2. Essential hypertension I10 401.9 AMB POC EKG ROUTINE W/ 12 LEADS, INTER & REP 3. Abnormal stress echo R94.39 794.39   
4. Mixed hyperlipidemia E78.2 272.2 Social History Tobacco Use  Smoking status: Never Smoker  Smokeless tobacco: Never Used Substance Use Topics  Alcohol use: Yes Comment: socially No family history on file. Review of Systems General: Pt denies excessive weight gain or loss. Pt is able to conduct ADL's HEENT: Denies blurred vision, headaches, epistaxis and difficulty swallowing. Respiratory: Denies shortness of breath, JAUREGUI, wheezing or stridor. Cardiovascular: Denies precordial pain, palpitations, edema or PND Gastrointestinal: Denies poor appetite, indigestion, abdominal pain or blood in stool Musculoskeletal: Denies pain or swelling from muscles or joints Neurologic: Denies tremor, paresthesias, or sensory motor disturbance Skin: Denies rash, itching or texture change. Objective:  
 
 
Vitals:  
 20 0935 20 5043 BP: 120/64 120/60 Pulse: 67 Resp: 16 SpO2: 97% Weight: 173 lb 1.6 oz (78.5 kg) Height: 5' 6\" (1.676 m) Body mass index is 27.94 kg/m². Dannial Pean Mental Status - Alert. General Appearance - Not in acute distress.  
 
Chest and Lung Exam  
 Inspection: Accessory muscles - No use of accessory muscles in breathing. Auscultation:  
Breath sounds: - Normal. 
 
Cardiovascular  
Inspection: Jugular vein - Bilateral - Inspection Normal. 
Palpation/Percussion:  
Apical Impulse: - Normal. 
Auscultation: Rhythm - Regular. Heart Sounds - S1 WNL and S2 WNL. No S3 or S4. Murmurs & Other Heart Sounds: Auscultation of the heart reveals - No Murmurs. Peripheral Vascular  
Upper Extremity: Inspection - Bilateral - No Cyanotic nailbeds or Digital clubbing. Lower Extremity:  
Palpation: Edema - Bilateral - No edema. Data Review:  
 
EKG -EKG: normal EKG, normal sinus rhythm, unchanged from previous tracings. Medications reviewed Current Outpatient Medications Medication Sig  VITAMIN D2 1,250 mcg (50,000 unit) capsule Take 1 Cap by mouth daily.  fenofibrate (LOFIBRA) 160 mg tablet Take 1 Tab by mouth daily.  cyanocobalamin (VITAMIN B12) 1,000 mcg/mL injection 1 mL by IntraMUSCular route every fourteen (14) days.  isosorbide mononitrate ER (IMDUR) 30 mg tablet Take 1 Tab by mouth every morning.  metoprolol succinate (TOPROL XL) 25 mg XL tablet Take 0.5 mg daily  aspirin delayed-release 81 mg tablet Take 1 Tab by mouth daily.  losartan (COZAAR) 50 mg tablet Take 25 mg by mouth daily.  tolterodine ER (DETROL-LA) 2 mg ER capsule Take 2 mg by mouth daily.  rosuvastatin (CRESTOR) 10 mg tablet Take 1 Tab by mouth nightly.  loratadine (CLARITIN) 10 mg tablet Take 10 mg by mouth as needed.  naproxen (NAPROSYN) 250 mg tablet Take  by mouth as needed.  lubiPROStone (AMITIZA) 8 mcg capsule Take 8 mcg by mouth daily as needed.  levothyroxine (SYNTHROID) 137 mcg tablet Take 125 mcg by mouth Daily (before breakfast).  FluvoxaMINE (LUVOX CR) 100 mg CR cap Take 200 mg by mouth daily.  fenofibrate nanocrystallized (TRICOR) 48 mg tablet Take 160 mg by mouth daily.  cyanocobalamin (VITAMIN B-12) 1,000 mcg tablet Take 1,000 mcg by mouth daily. No current facility-administered medications for this visit. Assessment: ICD-10-CM ICD-9-CM 1. Atypical chest pain R07.89 786.59   
2. Essential hypertension I10 401.9 AMB POC EKG ROUTINE W/ 12 LEADS, INTER & REP 3. Abnormal stress echo R94.39 794.39   
4. Mixed hyperlipidemia E78.2 272.2 Plan:  
 
Patient presents with continued symptoms despite anti anginal therapy. Her stress echo was abnormal. Discussed cath/PCI. [address] [place] [address] Marquis Dejesus MD

## 2020-02-29 LAB
BUN SERPL-MCNC: 20 MG/DL (ref 6–24)
BUN/CREAT SERPL: 22 (ref 9–23)
CALCIUM SERPL-MCNC: 10.3 MG/DL (ref 8.7–10.2)
CHLORIDE SERPL-SCNC: 102 MMOL/L (ref 96–106)
CO2 SERPL-SCNC: 25 MMOL/L (ref 20–29)
CREAT SERPL-MCNC: 0.91 MG/DL (ref 0.57–1)
ERYTHROCYTE [DISTWIDTH] IN BLOOD BY AUTOMATED COUNT: 12.8 % (ref 11.7–15.4)
GLUCOSE SERPL-MCNC: 71 MG/DL (ref 65–99)
HCT VFR BLD AUTO: 43 % (ref 34–46.6)
HGB BLD-MCNC: 14.4 G/DL (ref 11.1–15.9)
INR PPP: 1 (ref 0.8–1.2)
MCH RBC QN AUTO: 29.8 PG (ref 26.6–33)
MCHC RBC AUTO-ENTMCNC: 33.5 G/DL (ref 31.5–35.7)
MCV RBC AUTO: 89 FL (ref 79–97)
PLATELET # BLD AUTO: 402 X10E3/UL (ref 150–450)
POTASSIUM SERPL-SCNC: 4.4 MMOL/L (ref 3.5–5.2)
PROTHROMBIN TIME: 10.7 SEC (ref 9.1–12)
RBC # BLD AUTO: 4.84 X10E6/UL (ref 3.77–5.28)
SODIUM SERPL-SCNC: 143 MMOL/L (ref 134–144)
WBC # BLD AUTO: 6.5 X10E3/UL (ref 3.4–10.8)

## 2020-03-02 ENCOUNTER — TELEPHONE (OUTPATIENT)
Dept: CARDIOLOGY CLINIC | Age: 60
End: 2020-03-02

## 2020-03-02 NOTE — TELEPHONE ENCOUNTER
Verified patient with two identifiers. Spoke with pt, she is asking if she will be completed \"knocked out\" for procedure on 3/19 or just sedated? She said when she spoke with you she thought you stated sedated, but Dr. Kassidy Manriquez told her she was be \"knocked out\". Can you call pt? Thanks!

## 2020-03-02 NOTE — TELEPHONE ENCOUNTER
Verified patient with two identifiers. Spoke with pt advising her pt Dr. Felicitas Alex she will be sedated not \"knocked out\". She verbalized understanding.

## 2020-03-09 ENCOUNTER — HOSPITAL ENCOUNTER (OUTPATIENT)
Age: 60
Discharge: HOME | End: 2020-03-09
Attending: INTERNAL MEDICINE | Admitting: INTERNAL MEDICINE
Payer: COMMERCIAL

## 2020-03-09 VITALS
WEIGHT: 171 LBS | DIASTOLIC BLOOD PRESSURE: 57 MMHG | BODY MASS INDEX: 27.48 KG/M2 | HEIGHT: 66 IN | RESPIRATION RATE: 18 BRPM | HEART RATE: 76 BPM | OXYGEN SATURATION: 97 % | TEMPERATURE: 98.1 F | SYSTOLIC BLOOD PRESSURE: 102 MMHG

## 2020-03-09 DIAGNOSIS — R07.9 CHEST PAIN, UNSPECIFIED TYPE: ICD-10-CM

## 2020-03-09 PROBLEM — Z98.890 S/P CARDIAC CATH: Status: ACTIVE | Noted: 2020-03-09

## 2020-03-09 PROCEDURE — 77030019698 HC SYR ANGI MDLON MRTM -A: Performed by: INTERNAL MEDICINE

## 2020-03-09 PROCEDURE — 99152 MOD SED SAME PHYS/QHP 5/>YRS: CPT | Performed by: INTERNAL MEDICINE

## 2020-03-09 PROCEDURE — 77030010221 HC SPLNT WR POS TELE -B: Performed by: INTERNAL MEDICINE

## 2020-03-09 PROCEDURE — 77030008543 HC TBNG MON PRSS MRTM -A: Performed by: INTERNAL MEDICINE

## 2020-03-09 PROCEDURE — 74011000250 HC RX REV CODE- 250: Performed by: INTERNAL MEDICINE

## 2020-03-09 PROCEDURE — 77030019569 HC BND COMPR RAD TERU -B: Performed by: INTERNAL MEDICINE

## 2020-03-09 PROCEDURE — C1769 GUIDE WIRE: HCPCS | Performed by: INTERNAL MEDICINE

## 2020-03-09 PROCEDURE — 74011250636 HC RX REV CODE- 250/636: Performed by: INTERNAL MEDICINE

## 2020-03-09 PROCEDURE — 93458 L HRT ARTERY/VENTRICLE ANGIO: CPT | Performed by: INTERNAL MEDICINE

## 2020-03-09 PROCEDURE — 77030004549 HC CATH ANGI DX PRF MRTM -A: Performed by: INTERNAL MEDICINE

## 2020-03-09 PROCEDURE — 77030015766: Performed by: INTERNAL MEDICINE

## 2020-03-09 PROCEDURE — 74011636320 HC RX REV CODE- 636/320: Performed by: INTERNAL MEDICINE

## 2020-03-09 PROCEDURE — C1894 INTRO/SHEATH, NON-LASER: HCPCS | Performed by: INTERNAL MEDICINE

## 2020-03-09 RX ORDER — HEPARIN SODIUM 1000 [USP'U]/ML
INJECTION, SOLUTION INTRAVENOUS; SUBCUTANEOUS AS NEEDED
Status: DISCONTINUED | OUTPATIENT
Start: 2020-03-09 | End: 2020-03-09 | Stop reason: HOSPADM

## 2020-03-09 RX ORDER — HEPARIN SODIUM 200 [USP'U]/100ML
INJECTION, SOLUTION INTRAVENOUS
Status: COMPLETED | OUTPATIENT
Start: 2020-03-09 | End: 2020-03-09

## 2020-03-09 RX ORDER — VERAPAMIL HYDROCHLORIDE 2.5 MG/ML
INJECTION, SOLUTION INTRAVENOUS AS NEEDED
Status: DISCONTINUED | OUTPATIENT
Start: 2020-03-09 | End: 2020-03-09 | Stop reason: HOSPADM

## 2020-03-09 RX ORDER — FENTANYL CITRATE 50 UG/ML
INJECTION, SOLUTION INTRAMUSCULAR; INTRAVENOUS AS NEEDED
Status: DISCONTINUED | OUTPATIENT
Start: 2020-03-09 | End: 2020-03-09 | Stop reason: HOSPADM

## 2020-03-09 RX ORDER — MIDAZOLAM HYDROCHLORIDE 1 MG/ML
INJECTION, SOLUTION INTRAMUSCULAR; INTRAVENOUS AS NEEDED
Status: DISCONTINUED | OUTPATIENT
Start: 2020-03-09 | End: 2020-03-09 | Stop reason: HOSPADM

## 2020-03-09 RX ORDER — LIDOCAINE HYDROCHLORIDE 10 MG/ML
INJECTION, SOLUTION EPIDURAL; INFILTRATION; INTRACAUDAL; PERINEURAL AS NEEDED
Status: DISCONTINUED | OUTPATIENT
Start: 2020-03-09 | End: 2020-03-09 | Stop reason: HOSPADM

## 2020-03-09 RX ADMIN — SODIUM CHLORIDE 250 ML: 900 INJECTION, SOLUTION INTRAVENOUS at 17:06

## 2020-03-09 NOTE — Clinical Note
TRANSFER - OUT REPORT:     Verbal report given to: Jaquelin SWIFT. Report consisted of patient's Situation, Background, Assessment and   Recommendations(SBAR). Opportunity for questions and clarification was provided. Patient transported with a Registered Nurse. Patient transported to: [facility]CU.

## 2020-03-09 NOTE — INTERVAL H&P NOTE
H&P Update: 
Julieta Retana was seen and examined. History and physical has been reviewed. The patient has been examined.  There have been no significant clinical changes since the completion of the originally dated History and Physical.

## 2020-03-09 NOTE — PROGRESS NOTES
2:35 PM Patient returned to room 2154 from CCL. TR band present to R radial cath site is c/d/i no bleeding and no hematoma. PH at [address] [place] [address]. Pulses palpable all extremities. VSS. Assessment complete. Patient re-oriented to room and call bell system. Bed wheels locked and bed in lowest position. Call bell within reach. Will continue to monitor closely. 4:30 PM TR band taken down without complications. RUE pulse palpable. VSS. Will continue to monitor. 5:00 PM Notified Dr. Leyla Zambrano of trend in patient's BP. MD states to give one time IVF bolus of 250 cc NS. See MAR.     6:15 PM Patient ambulated to bathroom and voided without difficulty. Patient ambulated in hallway with standby assist and tolerated well. Patient specifically denies c/o pain, shortness of breath and/or dizziness. R radial cath site remains c/d/i no bleeding and no hematoma post ambulation. Dr. Leyla Zambrano notified to determine if patient is cleared for discharge. 6:30 PM Received call back from Dr. Leyla Zambrano. Patient is OK to discharge per MD. PIV removed, tip intact. 6:45 PM Discharge instructions reviewed with patient by RNDelaeny, including, but not limited to, medications, follow up appointments and post cath site care. Patient given opportunity for questions and verbalized understanding of all instructions. Patient has all belongings and instructions on discharge. R radial cath site c/d/i no bleeding and no hematoma on discharge. Patient escorted to private vehicle via wheelchair for discharge. Patient's family to drive her home.

## 2020-03-09 NOTE — PROGRESS NOTES
Camron Benites is recovering post-diagnostic [place] . Right radial site dressing is CDI without swelling or bleeding. VSS. Rhythm NSR. Camron Benites denies complaints at this time. If recovery continues to progress without complication, discharge is planned for later today.     Maylon Goodell, NP  MSN, RN, AGAP-BC

## 2020-03-09 NOTE — PROGRESS NOTES
Problem: Patient Education: Go to Patient Education Activity  Goal: Patient/Family Education  Outcome: Resolved/Met     Problem: Cath Lab Procedures: Pre-Procedure  Goal: Off Pathway (Use only if patient is Off Pathway)  Outcome: Resolved/Met  Goal: Activity/Safety  Outcome: Resolved/Met  Goal: Consults, if ordered  Outcome: Resolved/Met  Goal: Diagnostic Test/Procedures  Outcome: Resolved/Met  Goal: Nutrition/Diet  Outcome: Resolved/Met  Goal: Discharge Planning  Outcome: Resolved/Met  Goal: Medications  Outcome: Resolved/Met  Goal: Respiratory  Outcome: Resolved/Met  Goal: Treatments/Interventions/Procedures  Outcome: Resolved/Met  Goal: Psychosocial  Outcome: Resolved/Met  Goal: *Verbalize description of procedure  Outcome: Resolved/Met  Goal: *Consent signed  Outcome: Resolved/Met     Problem: Cath Lab Procedures: Post-Cath Day of Procedure (Initiate SCIP Measures for Post-Op Care)  Goal: Off Pathway (Use only if patient is Off Pathway)  Outcome: Resolved/Met  Goal: Activity/Safety  Outcome: Resolved/Met  Goal: Consults, if ordered  Outcome: Resolved/Met  Goal: Diagnostic Test/Procedures  Outcome: Resolved/Met  Goal: Nutrition/Diet  Outcome: Resolved/Met  Goal: Discharge Planning  Outcome: Resolved/Met  Goal: Medications  Outcome: Resolved/Met  Goal: Respiratory  Outcome: Resolved/Met  Goal: Treatments/Interventions/Procedures  Outcome: Resolved/Met  Goal: Psychosocial  Outcome: Resolved/Met  Goal: *Procedure site is without bleeding and signs of infection six hours post sheath removal  Outcome: Resolved/Met  Goal: *Hemodynamically stable  Outcome: Resolved/Met  Goal: *Optimal pain control at patient's stated goal  Outcome: Resolved/Met     Problem: Cath Lab Procedures: Post-Cath Day 1  Goal: Off Pathway (Use only if patient is Off Pathway)  Outcome: Resolved/Met  Goal: Activity/Safety  Outcome: Resolved/Met  Goal: Diagnostic Test/Procedures  Outcome: Resolved/Met  Goal: Nutrition/Diet  Outcome: Resolved/Met  Goal: Discharge Planning  Outcome: Resolved/Met  Goal: Medications  Outcome: Resolved/Met  Goal: Respiratory  Outcome: Resolved/Met  Goal: Treatments/Interventions/Procedures  Outcome: Resolved/Met  Goal: Psychosocial  Outcome: Resolved/Met     Problem: Cath Lab Procedures: Discharge Outcomes  Goal: *Stable cardiac rhythm  Outcome: Resolved/Met  Goal: *Hemodynamically stable  Outcome: Resolved/Met  Goal: *Optimal pain control at patient's stated goal  Outcome: Resolved/Met  Goal: *Pulses palpable, skin color within defined limits, skin temperature warm  Outcome: Resolved/Met  Goal: *Lungs clear or at baseline  Outcome: Resolved/Met  Goal: *Demonstrates ability to perform prescribed activity without shortness of breath or discomfort  Outcome: Resolved/Met  Goal: *Verbalizes home exercise program, activity guidelines, cardiac precautions  Outcome: Resolved/Met  Goal: *Verbalizes understanding and describes prescribed diet  Outcome: Resolved/Met  Goal: *Verbalizes understanding and describes medication purposes and frequencies  Outcome: Resolved/Met  Goal: *Identifies cardiac risk factors  Outcome: Resolved/Met  Goal: *No signs and symptoms of infection or wound complications  Outcome: Resolved/Met  Goal: *Anxiety reduced or absent  Outcome: Resolved/Met  Goal: *Verbalizes and demonstrates incision care  Outcome: Resolved/Met  Goal: *Understands and describes signs and symptoms to report to providers(Stroke Metric)  Outcome: Resolved/Met  Goal: *Describes follow-up/return visits to physicians  Outcome: Resolved/Met  Goal: *Describes available resources and support systems  Outcome: Resolved/Met  Goal: *Influenza immunization  Outcome: Resolved/Met  Goal: *Pneumococcal immunization  Outcome: Resolved/Met     Problem: Falls - Risk of  Goal: *Absence of Falls  Description  Document Miguel Angel Fall Risk and appropriate interventions in the flowsheet.   Outcome: Resolved/Met  Note: Fall Risk Interventions: Medication Interventions: Patient to call before getting OOB, Teach patient to arise slowly                   Problem: Patient Education: Go to Patient Education Activity  Goal: Patient/Family Education  Outcome: Resolved/Met

## 2020-03-09 NOTE — DISCHARGE INSTRUCTIONS
[address]  [phone]        Patient ID:  Camron Benites  [id number]  63 y.o.  1960    Admit Date: 3/9/2020    Discharge Date: 3/9/2020     Admitting Physician: Aly Palafox MD     Discharge Physician: Braulio Schneider NP    Admission Diagnoses:   Chest pain, unspecified type [R07.9]    Discharge Diagnoses: Active Problems:    S/P cardiac cath (3/9/2020)      Overview: 3/9/2020- diagnostic [facility]        Discharge Condition: Good    Cardiology Procedures this Admission:  Diagnostic left heart catheterization    Disposition: home    Reference discharge instructions provided by nursing for diet and activity. Signed:  Braulio Schneider NP  3/9/2020  2:40 PM      Radial Cardiac Catheterization/Angiography Discharge Instructions    It is normal to feel tired the first couple days. Take it easy and follow the physicians instructions. CHECK THE CATHETER INSERTION SITE DAILY:    Remove the wrist dressing 24 hours after the procedure. You may shower 24 hours after the procedure. Wash with soap and water and pat dry. Gentle cleaning of the site with soap and water is sufficient, cover with a dry clean dressing or bandage. Do not apply creams or powders to the area. No soaking the wrist for 3 days. Leave the puncture site open to air after 24 hours post-procedure. CALL THE PHYSICIANS:     If the site becomes red, swollen or feels warm to the touch  If there is bleeding or drainage or if there is unusual pain at the radial site. If there is any minor oozing, you may apply a band-aid and remove after 12 hours. If the bleeding continues, hold pressure with the middle finger against the puncture site and the thumb against the back of the wrist,call 911 to be transported to the hospital.  DO NOT DRIVE YOURSELF, [place] 0[address].     ACTIVITY:   For the first 24 hours do not manipulate the wrist.  No lifting, pushing or pulling over 3-5 pounds with the affected wrist for 7 daysand no straining the insertion site. Do not life grocery bags or the garbage can, do not run the vacuum  or  for 7 days. Start with short walks as in the hospital and gradually increase as tolerated each day. It is recommended to walk 30 minutes 5-7 days per week. Follow your physicians instructions on activity. Avoid walking outside in extremes of heat or cold. Walk inside when it is cold and windy or hot and humid. Things to keep in mind:  No driving for at least 24 hours, or as designated by your physician. Limit the number of times you go up and down the stairs  Take rests and pace yourself with activity. Be careful and do not strain with bowel movements. MEDICATIONS:    Take all medications as prescribed  Call your physician if you have any questions  Keep an updated list of your medications with you at all times and give a list to your physician and pharmacist    SIGNS AND SYMPTOMS:   Be cautious of symptoms of angina or recurrent symptoms such as chest discomfort, unusual shortness of breath or fatigue. These could be symptoms of restenosis, a new blockage or a heart attack. If your symptoms are relieved with rest it is still recommended that you notify your physician of recurrent chest pain or discomfort. For CHEST PAIN or symptoms of angina not relieved with rest:  If the discomfort is not relieved with rest, and you have been prescribed Nitroglycerin, take as directed (taken under the tongue, one at a time 5 minutes apart for a total of 3 doses). If the discomfort is not relieved after the 3rd nitroglycerin, call 911. If you have not been prescribed Nitroglycerin  and your chest discomfort is not relieved with rest, call 911. AFTER CARE:   Follow up with your physician as instructed.   Follow a heart healthy diet with proper portion control, daily stress management, daily exercise, blood pressure and cholesterol control , and smoking cessation.

## 2020-03-09 NOTE — PROGRESS NOTES
TRANSFER - IN REPORT:    Verbal report received from Rock lurdes RN (name) on Rosario Spanish  being received from CCL (unit) for routine progression of care      Report consisted of patients Situation, Background, Assessment and   Recommendations(SBAR). Information from the following report(s) SBAR, Kardex, Intake/Output, MAR, Recent Results, Med Rec Status and Cardiac Rhythm NSR was reviewed with the receiving nurse. Opportunity for questions and clarification was provided. Assessment completed upon patients arrival to unit and care assumed.

## 2020-03-19 RX ORDER — LOSARTAN POTASSIUM 50 MG/1
25 TABLET ORAL DAILY
Qty: 90 TAB | Refills: 3 | Status: SHIPPED | OUTPATIENT
Start: 2020-03-19 | End: 2020-06-12 | Stop reason: SDUPTHER

## 2020-05-05 ENCOUNTER — VIRTUAL VISIT (OUTPATIENT)
Dept: CARDIOLOGY CLINIC | Age: 60
End: 2020-05-05

## 2020-05-05 VITALS
HEART RATE: 64 BPM | BODY MASS INDEX: 27.28 KG/M2 | DIASTOLIC BLOOD PRESSURE: 82 MMHG | SYSTOLIC BLOOD PRESSURE: 139 MMHG | WEIGHT: 169 LBS

## 2020-05-05 DIAGNOSIS — I10 ESSENTIAL HYPERTENSION: Primary | ICD-10-CM

## 2020-05-05 DIAGNOSIS — R07.89 ATYPICAL CHEST PAIN: ICD-10-CM

## 2020-05-05 DIAGNOSIS — E78.2 MIXED HYPERLIPIDEMIA: ICD-10-CM

## 2020-05-05 NOTE — PROGRESS NOTES
Cardiology Telemedicine Encounter    Phu Thompson is a 63 y.o. female who was seen by synchronous (real-time) audio-video technology on 5/5/2020           Subjective/HPI:     Phu Thompson is a 63 y.o. female is here for routine follow-up via virtual visit. She has a PMHx of atypical chest pain. Recent cath showed no CAD. Mild LAD bridging. Radial site has healed well. PCP Provider  BELA Wood    Past Medical History:   Diagnosis Date    Hypercholesteremia     Hypertension     Hypothyroidism         Allergies   Allergen Reactions    Doxycycline Hives        Outpatient Encounter Medications as of 5/5/2020   Medication Sig Dispense Refill    losartan (COZAAR) 50 mg tablet Take 0.5 Tabs by mouth daily. 90 Tab 3    VITAMIN D2 1,250 mcg (50,000 unit) capsule Take 1 Cap by mouth every seven (7) days.  fenofibrate (LOFIBRA) 160 mg tablet Take 1 Tab by mouth daily.  cyanocobalamin (VITAMIN B12) 1,000 mcg/mL injection 1 mL by IntraMUSCular route every fourteen (14) days.  isosorbide mononitrate ER (IMDUR) 30 mg tablet Take 1 Tab by mouth every morning. 30 Tab 3    metoprolol succinate (TOPROL XL) 25 mg XL tablet Take 0.5 mg daily 30 Tab 2    aspirin delayed-release 81 mg tablet Take 1 Tab by mouth daily.  tolterodine ER (DETROL-LA) 2 mg ER capsule Take 2 mg by mouth daily.  rosuvastatin (CRESTOR) 10 mg tablet Take 1 Tab by mouth nightly. 90 Tab 3    loratadine (CLARITIN) 10 mg tablet Take 10 mg by mouth as needed.  naproxen (NAPROSYN) 250 mg tablet Take  by mouth as needed.  lubiPROStone (AMITIZA) 8 mcg capsule Take 8 mcg by mouth daily as needed.  levothyroxine (SYNTHROID) 137 mcg tablet Take 125 mcg by mouth Daily (before breakfast).  FluvoxaMINE (LUVOX CR) 100 mg CR cap Take 200 mg by mouth daily.  fenofibrate nanocrystallized (TRICOR) 48 mg tablet Take 160 mg by mouth daily.       cyanocobalamin (VITAMIN B-12) 1,000 mcg tablet Take 1,000 mcg by mouth daily. No facility-administered encounter medications on file as of 5/5/2020. Review of Symptoms:    Review of Systems   Constitutional: Negative. Negative for chills and fever. HENT: Negative. Negative for hearing loss. Respiratory: Negative. Negative for cough, hemoptysis and shortness of breath. Cardiovascular: Negative. Negative for chest pain, palpitations, orthopnea, claudication, leg swelling and PND. Gastrointestinal: Negative. Negative for nausea and vomiting. Musculoskeletal: Negative for myalgias. Skin: Negative. Negative for rash. Neurological: Negative. Negative for dizziness, loss of consciousness and headaches. Physical Exam:      General: Well developed, in no acute distress, cooperative and alert  HEENT: trach is midline. PEERL, EOM intact. Respiratory: No audible wheezing, no acute respiratory distress, normal effort of breathing  Extremities:  No cyanosis, atraumatic. No lower extremity edema. Neuro: A&Ox3, speech clear  Skin: Skin color is normal. No rashes or lesions. Non diaphoretic  Due to this being a TeleHealth evaluation, many elements of the physical examination are unable to be assessed.      Cardiology Labs:    No results found for: FLP, CHOL, HDL, LDLC, VLDL, CHHD    No results found for: HBA1C, YEC9VDFB, HUG7NYPD, QET3DTTT    Lab Results   Component Value Date/Time    Sodium 143 02/28/2020 11:11 AM    Potassium 4.4 02/28/2020 11:11 AM    Chloride 102 02/28/2020 11:11 AM    CO2 25 02/28/2020 11:11 AM    Glucose 71 02/28/2020 11:11 AM    BUN 20 02/28/2020 11:11 AM    Creatinine 0.91 02/28/2020 11:11 AM    BUN/Creatinine ratio 22 02/28/2020 11:11 AM    GFR est AA 80 02/28/2020 11:11 AM    GFR est non-AA 70 02/28/2020 11:11 AM    Calcium 10.3 (H) 02/28/2020 11:11 AM    Anion gap 6 03/04/2019 07:47 AM    Bilirubin, total 0.3 03/04/2019 07:47 AM    ALT (SGPT) 46 03/04/2019 07:47 AM    AST (SGOT) 43 (H) 03/04/2019 07:47 AM    Alk. phosphatase 86 03/04/2019 07:47 AM    Protein, total 7.3 03/04/2019 07:47 AM    Albumin 3.7 03/04/2019 07:47 AM    Globulin 3.6 03/04/2019 07:47 AM    A-G Ratio 1.0 (L) 03/04/2019 07:47 AM          Assessment:       ICD-10-CM ICD-9-CM    1. Essential hypertension I10 401.9    2. Atypical chest pain R07.89 786.59    3. Mixed hyperlipidemia E78.2 272.2         Plan:     Stable post cath. Can discontinue ASA, imdur. F/u in a months. James Wilkerson MD        This visit was completed using [employer] Virtual Visit telemedicine services    Pursuant to the emergency declaration under the [facility], 1135 waiver authority and the Coronavirus Preparedness and [employer] Act, this Virtual Visit was conducted, with patient's consent, to reduce the patient's risk of exposure to COVID-19 and provide continuity of care for an established patient. Services were provided through a video synchronous discussion virtually to substitute for in-person clinic visit.

## 2020-06-12 ENCOUNTER — VIRTUAL VISIT (OUTPATIENT)
Dept: CARDIOLOGY CLINIC | Age: 60
End: 2020-06-12

## 2020-06-12 DIAGNOSIS — Q24.5 MYOCARDIAL BRIDGE: Primary | ICD-10-CM

## 2020-06-12 DIAGNOSIS — E78.2 MIXED HYPERLIPIDEMIA: ICD-10-CM

## 2020-06-12 DIAGNOSIS — I10 ESSENTIAL HYPERTENSION: ICD-10-CM

## 2020-06-12 RX ORDER — LOSARTAN POTASSIUM 50 MG/1
50 TABLET ORAL DAILY
Qty: 90 TAB | Refills: 3 | Status: SHIPPED | OUTPATIENT
Start: 2020-06-12 | End: 2021-07-29

## 2020-06-12 RX ORDER — METOPROLOL SUCCINATE 25 MG/1
TABLET, EXTENDED RELEASE ORAL
Qty: 45 TAB | Refills: 3 | Status: SHIPPED | OUTPATIENT
Start: 2020-06-12 | End: 2020-08-28 | Stop reason: SDUPTHER

## 2020-06-12 NOTE — PROGRESS NOTES
Cardiology Telemedicine Encounter    Eliana Iraheta is a 63 y.o. female who was seen by synchronous (real-time) audio-video technology on 6/12/2020       JOHN Borja    Subjective/HPI:     Eliana Iraheta is a 63 y.o. female is here for routine follow-up via virtual visit. She has a PMHx of LAD myocardial bridging, HTN, HLD, hypothyroidism, and impaired glucose tolerance    She is doing well. She had only 2 episodes of chest pain in the past month, did not last long. Has been tolerating all meds well. Requests more information about heart healthy diet. She admits she has gained some weight and her blood sugars and triglyceride levels are higher due to poor eating habits during COVID-19 lockdown. Otherwise denies shortness of breath, palpitation symptoms. Denies dizziness or lower extremity edema. PCP Provider  BELA Wright    Past Medical History:   Diagnosis Date    Hypercholesteremia     Hypertension     Hypothyroidism         Allergies   Allergen Reactions    Doxycycline Hives        Outpatient Encounter Medications as of 6/12/2020   Medication Sig Dispense Refill    losartan (COZAAR) 50 mg tablet Take 0.5 Tabs by mouth daily. 90 Tab 3    VITAMIN D2 1,250 mcg (50,000 unit) capsule Take 1 Cap by mouth every seven (7) days.  fenofibrate (LOFIBRA) 160 mg tablet Take 1 Tab by mouth daily.  cyanocobalamin (VITAMIN B12) 1,000 mcg/mL injection 1 mL by IntraMUSCular route every fourteen (14) days.  isosorbide mononitrate ER (IMDUR) 30 mg tablet Take 1 Tab by mouth every morning. 30 Tab 3    metoprolol succinate (TOPROL XL) 25 mg XL tablet Take 0.5 mg daily 30 Tab 2    fenofibrate nanocrystallized (TRICOR) 48 mg tablet Take 160 mg by mouth daily.  aspirin delayed-release 81 mg tablet Take 1 Tab by mouth daily.  tolterodine ER (DETROL-LA) 2 mg ER capsule Take 2 mg by mouth daily.       rosuvastatin (CRESTOR) 10 mg tablet Take 1 Tab by mouth nightly. 90 Tab 3    cyanocobalamin (VITAMIN B-12) 1,000 mcg tablet Take 1,000 mcg by mouth daily.  loratadine (CLARITIN) 10 mg tablet Take 10 mg by mouth as needed.  naproxen (NAPROSYN) 250 mg tablet Take  by mouth as needed.  lubiPROStone (AMITIZA) 8 mcg capsule Take 8 mcg by mouth daily as needed.  levothyroxine (SYNTHROID) 137 mcg tablet Take 125 mcg by mouth Daily (before breakfast).  FluvoxaMINE (LUVOX CR) 100 mg CR cap Take 200 mg by mouth daily. No facility-administered encounter medications on file as of 6/12/2020. Review of Symptoms:    Review of Systems   Constitutional: Negative for chills, fever and weight loss. HENT: Negative for nosebleeds. Eyes: Negative for blurred vision and double vision. Respiratory: Negative for cough, shortness of breath and wheezing. Cardiovascular: Negative for chest pain, palpitations, orthopnea, leg swelling and PND. Gastrointestinal: Negative for abdominal pain, blood in stool, diarrhea, nausea and vomiting. Musculoskeletal: Negative for joint pain. Skin: Negative for rash. Neurological: Negative for dizziness, tingling and loss of consciousness. Endo/Heme/Allergies: Does not bruise/bleed easily. Physical Exam:      General: Well developed, in no acute distress, cooperative and alert  HEENT: sclera anicteric, PEERL, EOM intact, hearing intact  Respiratory: No audible wheezing, no use of accessory muscles, normal effort of breathing  Extremities:  No cyanosis, atraumatic. No lower extremity edema. Neuro: A&Ox3, speech clear, CN II-XII grosslyl normal  MSK: gait steady, walks without assistance  Skin: Skin color is normal. No rashes or lesions. Non diaphoretic  Psych: normal mood, affects. Thoughts/speech coherent  Due to this being a TeleHealth evaluation, many elements of the physical examination are unable to be assessed.      Patient-Reported Vitals 6/12/2020   Patient-Reported Weight 172 Patient-Reported Height 66 IN   Patient-Reported Pulse 72   Patient-Reported Systolic  933   Patient-Reported Diastolic 82     Cardiology Labs:    No results found for: FLP, CHOL, HDL, LDLC, VLDL, CHHD    No results found for: HBA1C, JTF3UTWB, FIQ9XGTG    Lab Results   Component Value Date/Time    Sodium 143 02/28/2020 11:11 AM    Potassium 4.4 02/28/2020 11:11 AM    Chloride 102 02/28/2020 11:11 AM    CO2 25 02/28/2020 11:11 AM    Glucose 71 02/28/2020 11:11 AM    BUN 20 02/28/2020 11:11 AM    Creatinine 0.91 02/28/2020 11:11 AM    BUN/Creatinine ratio 22 02/28/2020 11:11 AM    GFR est AA 80 02/28/2020 11:11 AM    GFR est non-AA 70 02/28/2020 11:11 AM    Calcium 10.3 (H) 02/28/2020 11:11 AM    Anion gap 6 03/04/2019 07:47 AM    Bilirubin, total 0.3 03/04/2019 07:47 AM    ALT (SGPT) 46 03/04/2019 07:47 AM    Alk. phosphatase 86 03/04/2019 07:47 AM    Protein, total 7.3 03/04/2019 07:47 AM    Albumin 3.7 03/04/2019 07:47 AM    Globulin 3.6 03/04/2019 07:47 AM    A-G Ratio 1.0 (L) 03/04/2019 07:47 AM          Assessment:       ICD-10-CM ICD-9-CM    1. 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 Essential hypertension I10 401.9    3. Mixed hyperlipidemia E78.2 272.2         Plan:     1. Myocardial bridge  Cath done 3/2020 without angiographic evidence of CAD, with mild myocardial bridging of the LAD  Symptoms controlled with present dose of beta blocker. Discussed nature of disease, medical management  Continue present meds; continue exercise and BP monitoring    2. Essential hypertension  BP controlled. Continue anti-hypertensive therapy and low sodium diet    3. Mixed hyperlipidemia  Lipids checked by endo  Lengthy discussion regarding low fat, low cholesterol diet. Discussed importance of fruits and vegetables and limiting sources of carbodhyrates such as bread, pasta, rice, and to choose whole wheat versions of these if necessary. Avoid sugary sweets if possible. Choose lean proteins, and avoid red meats.   Continue rosuvastatin, fenofibrate. Goal LDL < 100 in the absence of CAD    F/u with Dr. Yanelis Robles in 6 months    Allyson Luciano NP        This visit was completed using [employer] Virtual Visit telemedicine services    Pursuant to the emergency declaration under the [facility] waiver authority and the Coronavirus Preparedness and [employer] Act, this Virtual Visit was conducted, with patient's consent, to reduce the patient's risk of exposure to COVID-19 and provide continuity of care for an established patient. Services were provided through a video synchronous discussion virtually to substitute for in-person clinic visit.

## 2020-07-20 ENCOUNTER — TELEPHONE (OUTPATIENT)
Dept: CARDIOLOGY CLINIC | Age: 60
End: 2020-07-20

## 2020-07-20 NOTE — TELEPHONE ENCOUNTER
Spoke to patient using 2 identifiers. Patient stated she has been having on and off chest pressure with pain radiation towards her back x 1 week or so. 142/91 BP today. Has some chest discomfort now, not pain. Feels like a pulled muscle. Has not done any heavy lifting. Denies any other cardiac complaints. Please advise.

## 2020-07-20 NOTE — TELEPHONE ENCOUNTER
Patient needs to speak with the nurse in reference to having some pain off and on  in her chest and around her backand b/p running a little on the high side.           Thanks

## 2020-07-20 NOTE — TELEPHONE ENCOUNTER
Spoke to patient using 2 identifiers. Per Moe De Santiago NP, patient was made aware of the following:    Sneha Winn NP  You 26 minutes ago (1:24 PM)       Have her increase metoprolol to full tablet daily.  Call for HR < 50 bpm with dizziness. Can try tylenol/ibuprofen if she feels this is due to pulled muscle   Would fu with PCP if symptom persist      Patient verbalized understanding.

## 2020-08-28 ENCOUNTER — TELEPHONE (OUTPATIENT)
Dept: CARDIOLOGY CLINIC | Age: 60
End: 2020-08-28

## 2020-08-28 RX ORDER — METOPROLOL SUCCINATE 25 MG/1
TABLET, EXTENDED RELEASE ORAL
Qty: 90 TAB | Refills: 3 | Status: SHIPPED | OUTPATIENT
Start: 2020-08-28 | End: 2021-10-18

## 2020-08-28 NOTE — TELEPHONE ENCOUNTER
Spoke to patient using 2 identifiers. Per Tashia Cervantes NP, patient was informed that it is okay to take meloxicam for hip pain as needed. Advised not to take it on a daily basis for more than 2 weeks. Patient verbalized understanding.

## 2020-08-28 NOTE — TELEPHONE ENCOUNTER
Spoke to patient using 2 identifiers. Patient would like to know if she can take meloxicam for hip pain since it may elevate blood pressures. Per patient, BP trends as follows:    128/83  129/77  118/74  128/76    Has had no reading of HR <50. Please advise.

## 2020-08-28 NOTE — TELEPHONE ENCOUNTER
The dosage for metoprolol increased, pharmacy need new prescription b/c pt is out of refills, [facility]    She also wants to know can she take neloxicam, please give her a call back    thanks

## 2020-10-06 ENCOUNTER — HOSPITAL ENCOUNTER (OUTPATIENT)
Dept: MAMMOGRAPHY | Age: 60
Discharge: HOME | End: 2020-10-06
Attending: OBSTETRICS & GYNECOLOGY
Payer: COMMERCIAL

## 2020-10-06 DIAGNOSIS — Z12.31 VISIT FOR SCREENING MAMMOGRAM: ICD-10-CM

## 2020-10-06 PROCEDURE — 77067 SCR MAMMO BI INCL CAD: CPT

## 2020-10-08 ENCOUNTER — TELEPHONE (OUTPATIENT)
Dept: CARDIOLOGY CLINIC | Age: 60
End: 2020-10-08

## 2020-10-08 NOTE — TELEPHONE ENCOUNTER
Patient need to speak with the nurse in reference to the following medication Nabumetone that her back doctor has prescribed  to her.             Thanks,

## 2020-10-08 NOTE — TELEPHONE ENCOUNTER
Spoke to patient using 2 identifiers. Patient would like to know if ok to take nabumetone 750 mg twice daily for her back since she is taking metoprolol and losartan. Please advise.

## 2020-10-08 NOTE — TELEPHONE ENCOUNTER
Spoke to patient using 2 identifiers. Per Homero Gutierrez NP, patient was made aware of the following:    Monica Connor NP  You 2 hours ago (11:54 AM)       Should be fine to take it for pain   Advise PCP should be monitoring labs related to this      Patient stated she is having second thoughts about taking nabumetone after hearing she had to get monitored for labs. Will speak to PCP regarding this medication.

## 2020-12-11 NOTE — PROGRESS NOTES
Subjective/HPI:     Deana Lundberg is a 62 y.o. female is here for routine f/u. She has a PMHx of LAD myocardial bridging, HTN, HLD, hypothyroidism, and impaired glucose tolerance. Denies any chest pain, SOB, palpitations. Seeing urology for bladder issues. PCP Provider  BELA Valera    Past Medical History:   Diagnosis Date    Hypercholesteremia     Hypertension     Hypothyroidism     Menopause         Allergies   Allergen Reactions    Doxycycline Hives        Outpatient Encounter Medications as of 12/15/2020   Medication Sig Dispense Refill    mirabegron ER (Myrbetriq) 25 mg ER tablet Take 25 mg by mouth daily.  metoprolol succinate (Toprol XL) 25 mg XL tablet Take 1 tablet by mouth daily. 90 Tab 3    linaCLOtide (Linzess) 72 mcg cap capsule Take 72 mcg by mouth daily as needed.  losartan (COZAAR) 50 mg tablet Take 1 Tab by mouth daily. 90 Tab 3    VITAMIN D2 1,250 mcg (50,000 unit) capsule Take 1 Cap by mouth every seven (7) days.  fenofibrate (LOFIBRA) 160 mg tablet Take 1 Tab by mouth daily.  cyanocobalamin (VITAMIN B12) 1,000 mcg/mL injection 1 mL by IntraMUSCular route every fourteen (14) days.  rosuvastatin (CRESTOR) 10 mg tablet Take 1 Tab by mouth nightly. 90 Tab 3    loratadine (CLARITIN) 10 mg tablet Take 10 mg by mouth as needed.  naproxen (NAPROSYN) 250 mg tablet Take 250 mg by mouth as needed.  levothyroxine (SYNTHROID) 125 mcg tablet Take 125 mcg by mouth Daily (before breakfast).  FluvoxaMINE (LUVOX CR) 100 mg CR cap Take 200 mg by mouth daily.  [DISCONTINUED] tolterodine ER (DETROL-LA) 2 mg ER capsule Take 2 mg by mouth daily. No facility-administered encounter medications on file as of 12/15/2020. Review of Symptoms:    Review of Systems   Constitutional: Negative for chills, fever and weight loss. HENT: Negative for nosebleeds. Eyes: Negative for blurred vision and double vision. Respiratory: Negative for cough, shortness of breath and wheezing. Cardiovascular: Negative for chest pain, palpitations, orthopnea, leg swelling and PND. Gastrointestinal: Negative for abdominal pain, blood in stool, diarrhea, nausea and vomiting. Musculoskeletal: Negative for joint pain. Skin: Negative for rash. Neurological: Negative for dizziness, tingling and loss of consciousness. Endo/Heme/Allergies: Does not bruise/bleed easily. Physical Exam:      General: Well developed, in no acute distress, cooperative and alert  HEENT: No carotid bruits, no JVD, trach is midline. Neck Supple, PEERL, EOM intact. Heart:  reg rate and rhythm; normal S1/S2; no murmurs, no gallops or rubs. Respiratory: Clear bilaterally x 4, no wheezing or rales  Abdomen:   Soft, non-tender, no distention, no masses. + BS. Extremities:  Normal cap refill, no cyanosis, atraumatic. No edema. Neuro: A&Ox3, speech clear, gait stable. Skin: Skin color is normal. No rashes or lesions. Non diaphoretic  Vascular: 2+ pulses symmetric in all extremities    Vitals:    12/15/20 0917   BP: 100/60   Pulse: 64   Resp: 16   SpO2: 96%   Weight: 177 lb 6.4 oz (80.5 kg)   Height: 5' 6.5\" (1.689 m)       ECG: sinus rythm    Cardiology Labs:    No results found for: FLP, CHOL, HDL, LDLC, VLDL, CHHD    No results found for: HBA1C, MBE9DBMK, AUJ3KDNA, WRD4QOCH    Lab Results   Component Value Date/Time    Sodium 143 02/28/2020 11:11 AM    Potassium 4.4 02/28/2020 11:11 AM    Chloride 102 02/28/2020 11:11 AM    CO2 25 02/28/2020 11:11 AM    Glucose 71 02/28/2020 11:11 AM    BUN 20 02/28/2020 11:11 AM    Creatinine 0.91 02/28/2020 11:11 AM    BUN/Creatinine ratio 22 02/28/2020 11:11 AM    GFR est AA 80 02/28/2020 11:11 AM    GFR est non-AA 70 02/28/2020 11:11 AM    Calcium 10.3 (H) 02/28/2020 11:11 AM    Anion gap 6 03/04/2019 07:47 AM    Bilirubin, total 0.3 03/04/2019 07:47 AM    ALT (SGPT) 46 03/04/2019 07:47 AM    Alk.  phosphatase 86 03/04/2019 07:47 AM    Protein, total 7.3 03/04/2019 07:47 AM    Albumin 3.7 03/04/2019 07:47 AM    Globulin 3.6 03/04/2019 07:47 AM    A-G Ratio 1.0 (L) 03/04/2019 07:47 AM          Assessment:       ICD-10-CM ICD-9-CM    1. Myocardial bridge  Q24.5 746.85 AMB POC EKG ROUTINE W/ 12 LEADS, INTER & REP   2. Essential hypertension  I10 401.9 AMB POC EKG ROUTINE W/ 12 LEADS, INTER & REP   3. Mixed hyperlipidemia  E78.2 272.2 AMB POC EKG ROUTINE W/ 12 LEADS, INTER & REP        Plan:     1. Myocardial bridge  Cath done 3/2020 without angiographic evidence of CAD, with mild myocardial bridging of the LAD  Continue BB therapy  Continue present meds; continue exercise and BP monitoring     2. Essential hypertension  BP controlled. Continue anti-hypertensive therapy and low sodium diet     3.  Mixed hyperlipidemia  LDL 53 in 3/2020  Continue statin therapy and low fat, low cholesterol diet  Lipids managed by PCP    F/u with me in 1 year    Martha White MD

## 2020-12-15 ENCOUNTER — OFFICE VISIT (OUTPATIENT)
Dept: CARDIOLOGY CLINIC | Age: 60
End: 2020-12-15
Payer: COMMERCIAL

## 2020-12-15 VITALS
RESPIRATION RATE: 16 BRPM | HEIGHT: 67 IN | DIASTOLIC BLOOD PRESSURE: 60 MMHG | WEIGHT: 177.4 LBS | SYSTOLIC BLOOD PRESSURE: 100 MMHG | BODY MASS INDEX: 27.84 KG/M2 | HEART RATE: 64 BPM | OXYGEN SATURATION: 96 %

## 2020-12-15 DIAGNOSIS — I10 ESSENTIAL HYPERTENSION: ICD-10-CM

## 2020-12-15 DIAGNOSIS — E78.2 MIXED HYPERLIPIDEMIA: ICD-10-CM

## 2020-12-15 DIAGNOSIS — Q24.5 MYOCARDIAL BRIDGE: Primary | ICD-10-CM

## 2020-12-15 PROCEDURE — 99213 OFFICE O/P EST LOW 20 MIN: CPT | Performed by: INTERNAL MEDICINE

## 2020-12-15 PROCEDURE — 93000 ELECTROCARDIOGRAM COMPLETE: CPT | Performed by: INTERNAL MEDICINE

## 2020-12-15 NOTE — PROGRESS NOTES
Chief Complaint   Patient presents with    Follow-up    Hypertension    Cholesterol Problem     1. Have you been to the ER, urgent care clinic since your last visit? NO  Hospitalized since your last visit? NO  2. Have you seen or consulted any other health care providers outside of the [address] [place] [address] since your last visit? Yes  endocrinologists  & urology . Include any pap smears or colon screening. Yes   Patient has questions about the use of Myrbetriq and other medications she is taking.

## 2020-12-15 NOTE — LETTER
12/15/20 Patient: Stacy Thomas YOB: 1960 Date of Visit: 12/15/2020 BELA Liriano 
[address] [facility] [place] [address] VIA Facsimile: [phone] Dear BELA Liriano, Thank you for referring Ms. Yoana Putnam to [facility] for evaluation. My notes for this consultation are attached. If you have questions, please do not hesitate to call me. I look forward to following your patient along with you. Sincerely, Lindsay Randhawa MD

## 2021-02-18 RX ORDER — ROSUVASTATIN CALCIUM 10 MG/1
TABLET, COATED ORAL
Qty: 90 TAB | Refills: 3 | Status: SHIPPED | OUTPATIENT
Start: 2021-02-18

## 2021-02-19 NOTE — TELEPHONE ENCOUNTER
Please resend to [employer] [facility] in [facility]    Rosuvastatin 10 mg    Thanks  Phyllis Altamirano

## 2021-10-15 ENCOUNTER — TELEPHONE (OUTPATIENT)
Dept: CARDIOLOGY CLINIC | Age: 61
End: 2021-10-15

## 2021-10-15 NOTE — TELEPHONE ENCOUNTER
Patient called and wants to know if it is okay for her to have electrolysis. Please call her at [phone].     Hamer Lines

## 2021-10-20 NOTE — TELEPHONE ENCOUNTER
This writer contacted patient in reference to Hair Electrolysis. Two patient identifiers confirmed. Patient was advised per LOBITO Richards it is fine to proceed to electrolysis. Patient verbalized understanding and denies further questions at this time.

## 2021-10-20 NOTE — TELEPHONE ENCOUNTER
This writer contacted patient in reference to electrolysis. Two patient identifiers confirmed. Per MsAdelfo Army Rubin has hair electrolysis scheduled. Patient states she had electrolysis in the past, but wanted to ensure it was safe to have due to her HX of HTN.  Please Advice

## 2021-11-11 ENCOUNTER — OFFICE VISIT (OUTPATIENT)
Dept: CARDIOLOGY CLINIC | Age: 61
End: 2021-11-11
Payer: COMMERCIAL

## 2021-11-11 VITALS
HEIGHT: 66 IN | SYSTOLIC BLOOD PRESSURE: 130 MMHG | OXYGEN SATURATION: 96 % | DIASTOLIC BLOOD PRESSURE: 72 MMHG | WEIGHT: 173 LBS | HEART RATE: 80 BPM | BODY MASS INDEX: 27.8 KG/M2

## 2021-11-11 DIAGNOSIS — E78.2 MIXED HYPERLIPIDEMIA: ICD-10-CM

## 2021-11-11 DIAGNOSIS — Q24.5 MYOCARDIAL BRIDGE: Primary | ICD-10-CM

## 2021-11-11 DIAGNOSIS — I10 PRIMARY HYPERTENSION: ICD-10-CM

## 2021-11-11 PROCEDURE — 93000 ELECTROCARDIOGRAM COMPLETE: CPT | Performed by: NURSE PRACTITIONER

## 2021-11-11 PROCEDURE — 99214 OFFICE O/P EST MOD 30 MIN: CPT | Performed by: NURSE PRACTITIONER

## 2021-11-11 RX ORDER — METFORMIN HYDROCHLORIDE 500 MG/1
TABLET, EXTENDED RELEASE ORAL
COMMUNITY
Start: 2021-11-08

## 2021-11-11 NOTE — PROGRESS NOTES
Chief Complaint   Patient presents with    Follow-up     yearly     1. Have you been to the ER,No urgent care clinic since your last visit? No Hospitalized since your last visit? No  2. Have you seen or consulted any other health care providers outside of the [address] [place] [address] since your last visit? Include any pap smears or colon screening.  PCP Dr. Donald Kee

## 2021-11-24 ENCOUNTER — TRANSCRIBE ORDER (OUTPATIENT)
Dept: SCHEDULING | Age: 61
End: 2021-11-24

## 2021-11-24 DIAGNOSIS — Z12.31 SCREENING MAMMOGRAM FOR HIGH-RISK PATIENT: Primary | ICD-10-CM

## 2022-01-25 ENCOUNTER — HOSPITAL ENCOUNTER (OUTPATIENT)
Dept: MAMMOGRAPHY | Age: 62
Discharge: HOME | End: 2022-01-25
Attending: OBSTETRICS & GYNECOLOGY
Payer: COMMERCIAL

## 2022-01-25 DIAGNOSIS — Z12.31 SCREENING MAMMOGRAM FOR HIGH-RISK PATIENT: ICD-10-CM

## 2022-01-25 PROCEDURE — 77067 SCR MAMMO BI INCL CAD: CPT

## 2022-03-19 PROBLEM — E78.2 MIXED HYPERLIPIDEMIA: Status: ACTIVE | Noted: 2020-06-12

## 2022-03-19 PROBLEM — Z98.890 S/P CARDIAC CATH: Status: ACTIVE | Noted: 2020-03-09

## 2022-03-19 PROBLEM — Q24.5 MYOCARDIAL BRIDGE: Status: ACTIVE | Noted: 2020-06-12

## 2022-04-14 ENCOUNTER — TRANSCRIBE ORDER (OUTPATIENT)
Dept: SCHEDULING | Age: 62
End: 2022-04-14

## 2022-04-14 DIAGNOSIS — R10.13 DYSPEPSIA: ICD-10-CM

## 2022-04-14 DIAGNOSIS — K59.00 CONSTIPATION: Primary | ICD-10-CM

## 2022-04-14 DIAGNOSIS — R11.0 NAUSEA: ICD-10-CM

## 2022-04-14 DIAGNOSIS — K58.1 IRRITABLE BOWEL SYNDROME WITH CONSTIPATION: ICD-10-CM

## 2022-04-27 ENCOUNTER — HOSPITAL ENCOUNTER (OUTPATIENT)
Dept: NUCLEAR MEDICINE | Age: 62
Discharge: HOME | End: 2022-04-27
Attending: NURSE PRACTITIONER
Payer: COMMERCIAL

## 2022-04-27 VITALS — BODY MASS INDEX: 27.92 KG/M2 | WEIGHT: 173 LBS

## 2022-04-27 DIAGNOSIS — K59.00 CONSTIPATION: ICD-10-CM

## 2022-04-27 DIAGNOSIS — R10.13 DYSPEPSIA: ICD-10-CM

## 2022-04-27 DIAGNOSIS — R11.0 NAUSEA: ICD-10-CM

## 2022-04-27 DIAGNOSIS — K58.1 IRRITABLE BOWEL SYNDROME WITH CONSTIPATION: ICD-10-CM

## 2022-04-27 PROCEDURE — 74011250636 HC RX REV CODE- 250/636: Performed by: NURSE PRACTITIONER

## 2022-04-27 PROCEDURE — 78227 HEPATOBIL SYST IMAGE W/DRUG: CPT

## 2022-04-27 RX ORDER — KIT FOR THE PREPARATION OF TECHNETIUM TC 99M MEBROFENIN 45 MG/10ML
4.8 INJECTION, POWDER, LYOPHILIZED, FOR SOLUTION INTRAVENOUS
Status: COMPLETED | OUTPATIENT
Start: 2022-04-27 | End: 2022-04-27

## 2022-04-27 RX ADMIN — SINCALIDE 1.57 MCG: 5 INJECTION, POWDER, LYOPHILIZED, FOR SOLUTION INTRAVENOUS at 11:26

## 2022-04-27 RX ADMIN — KIT FOR THE PREPARATION OF TECHNETIUM TC 99M MEBROFENIN 4.8 MILLICURIE: 45 INJECTION, POWDER, LYOPHILIZED, FOR SOLUTION INTRAVENOUS at 10:00

## 2023-01-23 ENCOUNTER — TRANSCRIBE ORDER (OUTPATIENT)
Dept: SCHEDULING | Age: 63
End: 2023-01-23

## 2023-01-23 DIAGNOSIS — Z12.31 VISIT FOR SCREENING MAMMOGRAM: Primary | ICD-10-CM

## 2023-01-28 ENCOUNTER — HOSPITAL ENCOUNTER (OUTPATIENT)
Dept: MAMMOGRAPHY | Age: 63
End: 2023-01-28
Attending: OBSTETRICS & GYNECOLOGY
Payer: COMMERCIAL

## 2023-01-28 DIAGNOSIS — Z12.31 VISIT FOR SCREENING MAMMOGRAM: ICD-10-CM

## 2023-01-28 PROCEDURE — 77067 SCR MAMMO BI INCL CAD: CPT

## 2023-09-15 ENCOUNTER — HOSPITAL ENCOUNTER (OUTPATIENT)
Facility: HOSPITAL | Age: 63
Discharge: HOME | End: 2023-09-15
Payer: COMMERCIAL

## 2023-09-15 ENCOUNTER — HOSPITAL ENCOUNTER (OUTPATIENT)
Facility: HOSPITAL | Age: 63
End: 2023-09-15
Payer: COMMERCIAL

## 2023-09-15 VITALS — WEIGHT: 173 LBS | HEIGHT: 66 IN | BODY MASS INDEX: 27.8 KG/M2

## 2023-09-15 DIAGNOSIS — N64.4 MASTODYNIA: ICD-10-CM

## 2023-09-15 PROCEDURE — 77066 DX MAMMO INCL CAD BI: CPT

## 2023-09-15 PROCEDURE — G0279 TOMOSYNTHESIS, MAMMO: HCPCS

## 2023-10-30 NOTE — PROGRESS NOTES
Review Flowsheet  More data exists       10/30/2023   PHQ 2/9 Score   Peds PHQ 2 Score 2   Peds PHQ 2 Interpretation No further screening needed   Feeling down, depressed, irritable or hopeless? Several days   Little interest or pleasure in activity? Several days   Peds PHQ 9 Score 7   Peds PHQ 9 Interpretation Mild Depression   Trouble falling or staying asleep or sleeping too much? More than half the days   Poor appetite, weight loss, or overeating? Not at all   Feeling tired or having little energy? Several days   Feeling bad about yourself or that you are a failure or have let yourself or family down? Several days   Trouble concentrating on things such as school work, reading, or watching TV? Several days   Moving or speaking slowly that other people have noticed or the opposite - being so fidgety or restless that you have been moving around a lot more than usual? Not at all   Thoughts that you would be better off dead or of hurting yourself in some way? Not at all          Anabell Ramachandran, [facility]    Subjective/HPI:     Sari Holt is a 62 y.o. female is here for routine f/u. She has a PMHx of LAD myocardial bridging, HTN, HLD, hypothyroidism, and impaired glucose tolerance. Traveled to [place], [place] and [place] this year to celebrate her and her 's 61th birthday. Has noticed some fatigue this year. Also has a pain in her back that radiates to the front, improved with sitting up right. Otherwise denies complaints of chest pains, dizziness, orthopnea, PND or edema. Denies palpitation symptoms or shortness of breath. Current Outpatient Medications on File Prior to Visit   Medication Sig Dispense Refill    metFORMIN ER (GLUCOPHAGE XR) 500 mg tablet       metoprolol succinate (TOPROL-XL) 25 mg XL tablet TAKE ONE TABLET BY MOUTH ONE TIME DAILY 90 Tablet 3    losartan (COZAAR) 50 mg tablet Take 1 tablet by mouth daily 90 Tablet 3    rosuvastatin (CRESTOR) 10 mg tablet Take 1 tablet by mouth nightly  90 Tab 3    VITAMIN D2 1,250 mcg (50,000 unit) capsule Take 1 Cap by mouth every seven (7) days.  fenofibrate (LOFIBRA) 160 mg tablet Take 1 Tab by mouth daily.  cyanocobalamin (VITAMIN B12) 1,000 mcg/mL injection 1 mL by IntraMUSCular route every fourteen (14) days.  loratadine (CLARITIN) 10 mg tablet Take 10 mg by mouth as needed.  naproxen (NAPROSYN) 250 mg tablet Take 250 mg by mouth as needed.  levothyroxine (SYNTHROID) 125 mcg tablet Take 125 mcg by mouth Daily (before breakfast).  FluvoxaMINE (LUVOX CR) 100 mg CR cap Take 200 mg by mouth daily.  [DISCONTINUED] mirabegron ER (Myrbetriq) 25 mg ER tablet Take 25 mg by mouth daily. (Patient not taking: Reported on 11/11/2021)      [DISCONTINUED] linaCLOtide (Linzess) 72 mcg cap capsule Take 72 mcg by mouth daily as needed. (Patient not taking: Reported on 11/11/2021)       No current facility-administered medications on file prior to visit.        Review of Symptoms:    Review of Systems   Constitutional: Negative for chills, fever and weight loss. HENT: Negative for nosebleeds. Eyes: Negative for blurred vision and double vision. Respiratory: Negative for cough, shortness of breath and wheezing. Cardiovascular: Negative for chest pain, palpitations, orthopnea, leg swelling and PND. Skin: Negative for rash. Neurological: Negative for dizziness and loss of consciousness. Physical Exam:      General: Well developed, in no acute distress, cooperative and alert  Heart:  reg rate and rhythm; normal S1/S2; no murmurs, no gallops or rubs. Respiratory: Clear bilaterally x 4, no wheezing or rales  Extremities:  Normal cap refill, no cyanosis, atraumatic. No edema. Vascular: 2+ pulses symmetric in all extremities    Vitals:    11/11/21 1549   BP: 130/72   BP 1 Location: Right upper arm   Pulse: 80   Height: 5' 6\" (1.676 m)   Weight: 173 lb (78.5 kg)   SpO2: 96%       ECG done today shows sinus rhythm     Assessment:       ICD-10-CM ICD-9-CM    1. Myocardial bridge  Q24.5 746.85 AMB POC EKG ROUTINE W/ 12 LEADS, INTER & REP   2. Primary hypertension  I10 401.9    3. Mixed hyperlipidemia  E78.2 272.2         Plan:     1. Myocardial bridge  Cath done 3/2020 without angiographic evidence of CAD, with mild myocardial bridging of the LAD  Continue BB therapy  Continue present meds; continue exercise and BP monitoring     2. Essential hypertension  BP controlled.  Continue anti-hypertensive therapy and low sodium diet     3. Mixed hyperlipidemia  Continue statin therapy and low fat, low cholesterol diet  Lipids managed by endo    F/u with Dr. Raissa Encarnacion in 1 year    Tevin Shah NP      On this date 11/11/2021 I have spent 31 minutes reviewing previous notes, test results and face to face with the patient discussing the diagnosis and importance of compliance with the treatment plan as well as documenting on the day of the visit.

## 2024-07-01 ENCOUNTER — OFFICE VISIT (OUTPATIENT)
Age: 64
End: 2024-07-01
Payer: COMMERCIAL

## 2024-07-01 VITALS
BODY MASS INDEX: 27.48 KG/M2 | SYSTOLIC BLOOD PRESSURE: 132 MMHG | HEIGHT: 66 IN | WEIGHT: 171 LBS | OXYGEN SATURATION: 98 % | DIASTOLIC BLOOD PRESSURE: 84 MMHG | HEART RATE: 70 BPM

## 2024-07-01 DIAGNOSIS — R51.9 OCCIPITAL HEADACHE: Primary | ICD-10-CM

## 2024-07-01 PROCEDURE — 3075F SYST BP GE 130 - 139MM HG: CPT | Performed by: PSYCHIATRY & NEUROLOGY

## 2024-07-01 PROCEDURE — 3079F DIAST BP 80-89 MM HG: CPT | Performed by: PSYCHIATRY & NEUROLOGY

## 2024-07-01 PROCEDURE — 99203 OFFICE O/P NEW LOW 30 MIN: CPT | Performed by: PSYCHIATRY & NEUROLOGY

## 2024-07-01 RX ORDER — EPINEPHRINE 0.3 MG/.3ML
INJECTION SUBCUTANEOUS
COMMUNITY
Start: 2024-06-08

## 2024-07-01 RX ORDER — SEMAGLUTIDE 0.68 MG/ML
INJECTION, SOLUTION SUBCUTANEOUS
COMMUNITY
End: 2024-07-01

## 2024-07-01 RX ORDER — GABAPENTIN 100 MG/1
CAPSULE ORAL
COMMUNITY
End: 2024-07-01

## 2024-07-01 RX ORDER — HYDROXYZINE 50 MG/1
50 TABLET, FILM COATED ORAL 2 TIMES DAILY PRN
COMMUNITY
Start: 2024-06-13 | End: 2024-07-01

## 2024-07-01 RX ORDER — LANSOPRAZOLE 30 MG/1
CAPSULE, DELAYED RELEASE ORAL
COMMUNITY
Start: 2017-09-30 | End: 2024-07-01

## 2024-07-01 RX ORDER — PRAVASTATIN SODIUM 10 MG
10 TABLET ORAL DAILY
COMMUNITY

## 2024-07-01 NOTE — PROGRESS NOTES
Chief Complaint   Patient presents with    Neurologic Problem     \"Occipital headaches 2 times a week.\"     HISTORY OF PRESENT ILLNESS  Myra Li is a 63 y.o. female who came in to establish care with neurology as she used to see a neurologist until about 6 or 7 years ago continues to get occipital nerve blocks for occipital headaches and nerve pain.  She has a remote history of a cervical fusion at the upper cervical levels in 1985 and ever since she has had headaches, neck pain that flares up from time to time but overall she has been doing quite well.  She has not had any major flares of occipital pain but is concerned that if it does happen, she needs to have someone who can do occipital nerve blocks for her.  Occasionally she will take the naproxen and it usually helps.  Has not been on any prophylactic medications and is not keen on taking something on a regular basis.  Otherwise she denies any headaches associated with migrainous features.  She used to get those in the past.  No other focal motor or sensory deficits.      Past Medical History:   Diagnosis Date    Hypercholesteremia     Hypertension     Hypothyroidism     Menopause     Sleep apnea 08/2021     Current Outpatient Medications   Medication Sig    EPINEPHrine (EPIPEN) 0.3 MG/0.3ML SOAJ injection INJECT 1 PEN IN THE MUSCLE ONE TIME AS DIRECTED    pravastatin (PRAVACHOL) 10 MG tablet Take 1 tablet by mouth daily    fenofibrate (TRIGLIDE) 160 MG tablet Take 1 tablet by mouth daily    fluvoxaMINE (LUVOX CR) 100 MG CP24 extended release capsule Take 200 mg by mouth daily    levothyroxine (SYNTHROID) 125 MCG tablet Take 1 tablet by mouth every morning (before breakfast)    losartan (COZAAR) 50 MG tablet Take 1 tablet by mouth daily    metFORMIN (GLUCOPHAGE-XR) 500 MG extended release tablet ceived the following from [facility]: Outside name: metFORMIN ER (GLUCOPHAGE XR) 500 mg tablet    metoprolol succinate (TOPROL XL) 25 MG

## 2024-07-01 NOTE — PROGRESS NOTES
Chief Complaint   Patient presents with    Neurologic Problem     \"Occipital headaches 2 times a week.\"     Vitals:    07/01/24 1305   BP: 132/84   Pulse: 70   SpO2: 98%

## 2024-08-07 ENCOUNTER — HOSPITAL ENCOUNTER (OUTPATIENT)
Facility: HOSPITAL | Age: 64
Discharge: HOME | End: 2024-08-10
Attending: SPECIALIST
Payer: COMMERCIAL

## 2024-08-07 DIAGNOSIS — E89.0 POSTSURGICAL HYPOTHYROIDISM: ICD-10-CM

## 2024-08-07 DIAGNOSIS — E04.2 NONTOXIC MULTINODULAR GOITER: ICD-10-CM

## 2024-08-07 PROCEDURE — 76536 US EXAM OF HEAD AND NECK: CPT

## 2024-10-11 ENCOUNTER — HOSPITAL ENCOUNTER (OUTPATIENT)
Facility: HOSPITAL | Age: 64
Discharge: HOME | End: 2024-10-14
Payer: COMMERCIAL

## 2024-10-11 VITALS — WEIGHT: 171.08 LBS | BODY MASS INDEX: 27.49 KG/M2 | HEIGHT: 66 IN

## 2024-10-11 DIAGNOSIS — Z12.31 VISIT FOR SCREENING MAMMOGRAM: ICD-10-CM

## 2024-10-11 PROCEDURE — 77063 BREAST TOMOSYNTHESIS BI: CPT

## (undated) DEVICE — GLIDESHEATH SLENDER ACCESS KIT: Brand: GLIDESHEATH SLENDER

## (undated) DEVICE — SYRINGE ANGIO 10 CC BRL STD PRNT POLYCARB LT BLU MEDALLION

## (undated) DEVICE — CATHETER ETER ANGIO L110CM OD5FR ID046IN L75CM 038IN 145DEG CARD

## (undated) DEVICE — GUIDEWIRE VASC L260CM 0.035IN J TIP L3MM PTFE FIX COR NAMIC

## (undated) DEVICE — SPLINT WR POS F/ARTERIAL ACC -- BX/10

## (undated) DEVICE — RADIFOCUS OPTITORQUE ANGIOGRAPHIC CATHETER: Brand: OPTITORQUE

## (undated) DEVICE — TR BAND RADIAL ARTERY COMPRESSION DEVICE: Brand: TR BAND

## (undated) DEVICE — 3M™ TEGADERM™ TRANSPARENT FILM DRESSING FRAME STYLE, 1626W, 4 IN X 4-3/4 IN (10 CM X 12 CM), 50/CT 4CT/CASE: Brand: 3M™ TEGADERM™

## (undated) DEVICE — TUBING PRSS MON L6IN PVC M FEM CONN

## (undated) DEVICE — ANGIOGRAPHY KIT CUST [K0910930B] [MERIT MEDICAL SYSTEMS INC]

## (undated) DEVICE — HI-TORQUE VERSACORE FLOPPY GUIDE WIRE SYSTEM 145 CM: Brand: HI-TORQUE VERSACORE

## (undated) DEVICE — PACK PROCEDURE SURG HRT CATH